# Patient Record
Sex: FEMALE | Race: WHITE | Employment: OTHER | URBAN - NONMETROPOLITAN AREA
[De-identification: names, ages, dates, MRNs, and addresses within clinical notes are randomized per-mention and may not be internally consistent; named-entity substitution may affect disease eponyms.]

---

## 2024-02-26 ENCOUNTER — HOSPITAL ENCOUNTER (INPATIENT)
Age: 74
LOS: 2 days | Discharge: HOME | DRG: 310 | End: 2024-02-28
Attending: EMERGENCY MEDICINE | Admitting: INTERNAL MEDICINE
Payer: MEDICARE

## 2024-02-26 ENCOUNTER — APPOINTMENT (OUTPATIENT)
Dept: GENERAL RADIOLOGY | Age: 74
DRG: 310 | End: 2024-02-26
Payer: MEDICARE

## 2024-02-26 DIAGNOSIS — I48.91 ATRIAL FIBRILLATION WITH RVR (HCC): ICD-10-CM

## 2024-02-26 DIAGNOSIS — I48.91 ATRIAL FIBRILLATION WITH RAPID VENTRICULAR RESPONSE (HCC): Primary | ICD-10-CM

## 2024-02-26 LAB
ALBUMIN SERPL-MCNC: 4.6 G/DL (ref 3.5–5.2)
ALP SERPL-CCNC: 112 U/L (ref 35–104)
ALT SERPL-CCNC: 40 U/L (ref 5–33)
ANION GAP SERPL CALCULATED.3IONS-SCNC: 14 MMOL/L (ref 9–17)
AST SERPL-CCNC: 27 U/L
BASOPHILS # BLD: 0.03 K/UL (ref 0–0.2)
BASOPHILS NFR BLD: 0 % (ref 0–2)
BILIRUB SERPL-MCNC: 0.7 MG/DL (ref 0.3–1.2)
BUN SERPL-MCNC: 17 MG/DL (ref 8–23)
BUN/CREAT SERPL: 17 (ref 9–20)
CALCIUM SERPL-MCNC: 9.9 MG/DL (ref 8.6–10.4)
CHLORIDE SERPL-SCNC: 104 MMOL/L (ref 98–107)
CO2 SERPL-SCNC: 23 MMOL/L (ref 20–31)
CREAT SERPL-MCNC: 1 MG/DL (ref 0.5–0.9)
D DIMER PPP FEU-MCNC: 0.54 UG/ML FEU (ref 0–0.59)
EKG Q-T INTERVAL: 250 MS
EKG QRS DURATION: 72 MS
EKG QTC CALCULATION (BAZETT): 409 MS
EKG R AXIS: 130 DEGREES
EKG T AXIS: 3 DEGREES
EKG VENTRICULAR RATE: 161 BPM
EOSINOPHIL # BLD: 0.24 K/UL (ref 0–0.4)
EOSINOPHILS RELATIVE PERCENT: 3 % (ref 0–5)
ERYTHROCYTE [DISTWIDTH] IN BLOOD BY AUTOMATED COUNT: 13.1 % (ref 12.1–15.2)
GFR SERPL CREATININE-BSD FRML MDRD: 60 ML/MIN/1.73M2
GLUCOSE SERPL-MCNC: 119 MG/DL (ref 70–99)
HCT VFR BLD AUTO: 44 % (ref 36–46)
HGB BLD-MCNC: 14.2 G/DL (ref 12–16)
IMM GRANULOCYTES # BLD AUTO: 0 K/UL (ref 0–0.3)
IMM GRANULOCYTES NFR BLD: 0 % (ref 0–5)
LYMPHOCYTES NFR BLD: 1.41 K/UL (ref 1–4.8)
LYMPHOCYTES RELATIVE PERCENT: 20 % (ref 15–40)
MAGNESIUM SERPL-MCNC: 2.2 MG/DL (ref 1.6–2.6)
MCH RBC QN AUTO: 28.2 PG (ref 26–34)
MCHC RBC AUTO-ENTMCNC: 32.3 G/DL (ref 31–37)
MCV RBC AUTO: 87.5 FL (ref 80–100)
MONOCYTES NFR BLD: 0.31 K/UL (ref 0–1)
MONOCYTES NFR BLD: 4 % (ref 4–8)
NEUTROPHILS NFR BLD: 73 % (ref 47–75)
NEUTS SEG NFR BLD: 5.08 K/UL (ref 2.5–7)
PLATELET # BLD AUTO: 211 K/UL (ref 140–450)
PMV BLD AUTO: 11.1 FL (ref 6–12)
POTASSIUM SERPL-SCNC: 4 MMOL/L (ref 3.7–5.3)
PROT SERPL-MCNC: 7.3 G/DL (ref 6.4–8.3)
RBC # BLD AUTO: 5.03 M/UL (ref 4–5.2)
SODIUM SERPL-SCNC: 141 MMOL/L (ref 135–144)
TROPONIN I SERPL HS-MCNC: 15 NG/L (ref 0–14)
TROPONIN I SERPL HS-MCNC: 15 NG/L (ref 0–14)
TSH SERPL DL<=0.05 MIU/L-ACNC: 3.92 UIU/ML (ref 0.3–5)
WBC OTHER # BLD: 7.1 K/UL (ref 3.5–11)

## 2024-02-26 PROCEDURE — 2000000000 HC ICU R&B

## 2024-02-26 PROCEDURE — 6360000002 HC RX W HCPCS: Performed by: EMERGENCY MEDICINE

## 2024-02-26 PROCEDURE — 96365 THER/PROPH/DIAG IV INF INIT: CPT

## 2024-02-26 PROCEDURE — 6370000000 HC RX 637 (ALT 250 FOR IP): Performed by: EMERGENCY MEDICINE

## 2024-02-26 PROCEDURE — 84436 ASSAY OF TOTAL THYROXINE: CPT

## 2024-02-26 PROCEDURE — 36415 COLL VENOUS BLD VENIPUNCTURE: CPT

## 2024-02-26 PROCEDURE — 84443 ASSAY THYROID STIM HORMONE: CPT

## 2024-02-26 PROCEDURE — 99285 EMERGENCY DEPT VISIT HI MDM: CPT

## 2024-02-26 PROCEDURE — 84484 ASSAY OF TROPONIN QUANT: CPT

## 2024-02-26 PROCEDURE — 96375 TX/PRO/DX INJ NEW DRUG ADDON: CPT

## 2024-02-26 PROCEDURE — 83735 ASSAY OF MAGNESIUM: CPT

## 2024-02-26 PROCEDURE — 2500000003 HC RX 250 WO HCPCS: Performed by: INTERNAL MEDICINE

## 2024-02-26 PROCEDURE — 2500000003 HC RX 250 WO HCPCS: Performed by: EMERGENCY MEDICINE

## 2024-02-26 PROCEDURE — 96366 THER/PROPH/DIAG IV INF ADDON: CPT

## 2024-02-26 PROCEDURE — 6370000000 HC RX 637 (ALT 250 FOR IP): Performed by: INTERNAL MEDICINE

## 2024-02-26 PROCEDURE — 2580000003 HC RX 258: Performed by: INTERNAL MEDICINE

## 2024-02-26 PROCEDURE — 85025 COMPLETE CBC W/AUTO DIFF WBC: CPT

## 2024-02-26 PROCEDURE — 71045 X-RAY EXAM CHEST 1 VIEW: CPT

## 2024-02-26 PROCEDURE — 85379 FIBRIN DEGRADATION QUANT: CPT

## 2024-02-26 PROCEDURE — 93010 ELECTROCARDIOGRAM REPORT: CPT | Performed by: INTERNAL MEDICINE

## 2024-02-26 PROCEDURE — 94761 N-INVAS EAR/PLS OXIMETRY MLT: CPT

## 2024-02-26 PROCEDURE — 80053 COMPREHEN METABOLIC PANEL: CPT

## 2024-02-26 PROCEDURE — 93005 ELECTROCARDIOGRAM TRACING: CPT | Performed by: EMERGENCY MEDICINE

## 2024-02-26 RX ORDER — BUSPIRONE HYDROCHLORIDE 15 MG/1
15 TABLET ORAL 2 TIMES DAILY
Status: DISCONTINUED | OUTPATIENT
Start: 2024-02-26 | End: 2024-02-28 | Stop reason: HOSPADM

## 2024-02-26 RX ORDER — ATORVASTATIN CALCIUM 20 MG/1
20 TABLET, FILM COATED ORAL EVERY 24 HOURS
COMMUNITY

## 2024-02-26 RX ORDER — BUSPIRONE HYDROCHLORIDE 15 MG/1
15 TABLET ORAL 2 TIMES DAILY
COMMUNITY
Start: 2023-08-08

## 2024-02-26 RX ORDER — ONDANSETRON 2 MG/ML
4 INJECTION INTRAMUSCULAR; INTRAVENOUS EVERY 6 HOURS PRN
Status: DISCONTINUED | OUTPATIENT
Start: 2024-02-26 | End: 2024-02-28 | Stop reason: HOSPADM

## 2024-02-26 RX ORDER — SODIUM CHLORIDE 9 MG/ML
INJECTION, SOLUTION INTRAVENOUS PRN
Status: DISCONTINUED | OUTPATIENT
Start: 2024-02-26 | End: 2024-02-28 | Stop reason: HOSPADM

## 2024-02-26 RX ORDER — POLYETHYLENE GLYCOL 3350 17 G/17G
17 POWDER, FOR SOLUTION ORAL DAILY PRN
Status: DISCONTINUED | OUTPATIENT
Start: 2024-02-26 | End: 2024-02-28 | Stop reason: HOSPADM

## 2024-02-26 RX ORDER — BUDESONIDE AND FORMOTEROL FUMARATE DIHYDRATE 160; 4.5 UG/1; UG/1
2 AEROSOL RESPIRATORY (INHALATION) 2 TIMES DAILY
COMMUNITY
Start: 2024-02-05

## 2024-02-26 RX ORDER — DILTIAZEM HCL IN NACL,ISO-OSM 125 MG/125
2.5-15 PLASTIC BAG, INJECTION (ML) INTRAVENOUS CONTINUOUS
Status: DISCONTINUED | OUTPATIENT
Start: 2024-02-26 | End: 2024-02-28 | Stop reason: HOSPADM

## 2024-02-26 RX ORDER — ATORVASTATIN CALCIUM 20 MG/1
20 TABLET, FILM COATED ORAL DAILY
Status: DISCONTINUED | OUTPATIENT
Start: 2024-02-27 | End: 2024-02-28 | Stop reason: HOSPADM

## 2024-02-26 RX ORDER — ACETAMINOPHEN 650 MG/1
650 SUPPOSITORY RECTAL EVERY 6 HOURS PRN
Status: DISCONTINUED | OUTPATIENT
Start: 2024-02-26 | End: 2024-02-28 | Stop reason: HOSPADM

## 2024-02-26 RX ORDER — SODIUM CHLORIDE 0.9 % (FLUSH) 0.9 %
5-40 SYRINGE (ML) INJECTION PRN
Status: DISCONTINUED | OUTPATIENT
Start: 2024-02-26 | End: 2024-02-28 | Stop reason: HOSPADM

## 2024-02-26 RX ORDER — DILTIAZEM HYDROCHLORIDE 120 MG/1
120 CAPSULE, COATED, EXTENDED RELEASE ORAL DAILY
Status: DISCONTINUED | OUTPATIENT
Start: 2024-02-26 | End: 2024-02-28 | Stop reason: HOSPADM

## 2024-02-26 RX ORDER — LISINOPRIL 20 MG/1
20 TABLET ORAL DAILY
Status: DISCONTINUED | OUTPATIENT
Start: 2024-02-26 | End: 2024-02-26 | Stop reason: CLARIF

## 2024-02-26 RX ORDER — METOPROLOL TARTRATE 1 MG/ML
5 INJECTION, SOLUTION INTRAVENOUS ONCE
Status: COMPLETED | OUTPATIENT
Start: 2024-02-26 | End: 2024-02-26

## 2024-02-26 RX ORDER — SODIUM CHLORIDE 9 MG/ML
INJECTION, SOLUTION INTRAVENOUS CONTINUOUS
Status: DISCONTINUED | OUTPATIENT
Start: 2024-02-26 | End: 2024-02-27

## 2024-02-26 RX ORDER — DILTIAZEM HYDROCHLORIDE 5 MG/ML
10 INJECTION INTRAVENOUS ONCE
Status: COMPLETED | OUTPATIENT
Start: 2024-02-26 | End: 2024-02-26

## 2024-02-26 RX ORDER — LISINOPRIL 20 MG/1
20 TABLET ORAL DAILY
COMMUNITY
Start: 2023-11-30

## 2024-02-26 RX ORDER — ONDANSETRON 4 MG/1
4 TABLET, ORALLY DISINTEGRATING ORAL EVERY 8 HOURS PRN
Status: DISCONTINUED | OUTPATIENT
Start: 2024-02-26 | End: 2024-02-28 | Stop reason: HOSPADM

## 2024-02-26 RX ORDER — AMLODIPINE BESYLATE 10 MG/1
10 TABLET ORAL EVERY EVENING
Status: ON HOLD | COMMUNITY
End: 2024-02-27 | Stop reason: HOSPADM

## 2024-02-26 RX ORDER — LEVOTHYROXINE SODIUM 112 UG/1
112 TABLET ORAL DAILY
Status: DISCONTINUED | OUTPATIENT
Start: 2024-02-27 | End: 2024-02-28 | Stop reason: HOSPADM

## 2024-02-26 RX ORDER — LISINOPRIL AND HYDROCHLOROTHIAZIDE 12.5; 1 MG/1; MG/1
1 TABLET ORAL DAILY
COMMUNITY
Start: 2023-11-25

## 2024-02-26 RX ORDER — HYDROCHLOROTHIAZIDE 25 MG/1
12.5 TABLET ORAL DAILY
Status: DISCONTINUED | OUTPATIENT
Start: 2024-02-27 | End: 2024-02-28 | Stop reason: HOSPADM

## 2024-02-26 RX ORDER — SODIUM CHLORIDE 0.9 % (FLUSH) 0.9 %
5-40 SYRINGE (ML) INJECTION EVERY 12 HOURS SCHEDULED
Status: DISCONTINUED | OUTPATIENT
Start: 2024-02-26 | End: 2024-02-28 | Stop reason: HOSPADM

## 2024-02-26 RX ORDER — ACETAMINOPHEN 325 MG/1
650 TABLET ORAL EVERY 6 HOURS PRN
Status: DISCONTINUED | OUTPATIENT
Start: 2024-02-26 | End: 2024-02-28 | Stop reason: HOSPADM

## 2024-02-26 RX ORDER — LISINOPRIL AND HYDROCHLOROTHIAZIDE 12.5; 1 MG/1; MG/1
1 TABLET ORAL DAILY
Status: DISCONTINUED | OUTPATIENT
Start: 2024-02-26 | End: 2024-02-26 | Stop reason: CLARIF

## 2024-02-26 RX ORDER — LEVOTHYROXINE SODIUM 112 UG/1
112 TABLET ORAL DAILY
COMMUNITY
Start: 2023-09-05

## 2024-02-26 RX ORDER — DIGOXIN 0.25 MG/ML
500 INJECTION INTRAMUSCULAR; INTRAVENOUS ONCE
Status: COMPLETED | OUTPATIENT
Start: 2024-02-26 | End: 2024-02-26

## 2024-02-26 RX ADMIN — DIGOXIN 500 MCG: 0.25 INJECTION INTRAMUSCULAR; INTRAVENOUS at 13:21

## 2024-02-26 RX ADMIN — Medication 5 MG/HR: at 12:55

## 2024-02-26 RX ADMIN — DILTIAZEM HYDROCHLORIDE 10 MG: 5 INJECTION, SOLUTION INTRAVENOUS at 12:41

## 2024-02-26 RX ADMIN — BUSPIRONE HYDROCHLORIDE 15 MG: 15 TABLET ORAL at 20:12

## 2024-02-26 RX ADMIN — DILTIAZEM HYDROCHLORIDE 10 MG: 5 INJECTION, SOLUTION INTRAVENOUS at 13:47

## 2024-02-26 RX ADMIN — DILTIAZEM HYDROCHLORIDE 120 MG: 120 CAPSULE, COATED, EXTENDED RELEASE ORAL at 17:12

## 2024-02-26 RX ADMIN — METOROPROLOL TARTRATE 5 MG: 5 INJECTION, SOLUTION INTRAVENOUS at 17:08

## 2024-02-26 RX ADMIN — SODIUM CHLORIDE: 9 INJECTION, SOLUTION INTRAVENOUS at 16:32

## 2024-02-26 RX ADMIN — APIXABAN 5 MG: 5 TABLET, FILM COATED ORAL at 20:12

## 2024-02-26 RX ADMIN — APIXABAN 5 MG: 5 TABLET, FILM COATED ORAL at 13:50

## 2024-02-26 ASSESSMENT — LIFESTYLE VARIABLES
HOW MANY STANDARD DRINKS CONTAINING ALCOHOL DO YOU HAVE ON A TYPICAL DAY: PATIENT DOES NOT DRINK
HOW OFTEN DO YOU HAVE A DRINK CONTAINING ALCOHOL: NEVER

## 2024-02-26 ASSESSMENT — PAIN - FUNCTIONAL ASSESSMENT: PAIN_FUNCTIONAL_ASSESSMENT: NONE - DENIES PAIN

## 2024-02-26 NOTE — ED PROVIDER NOTES
EMERGENCY DEPARTMENT ENCOUNTER      CHIEF COMPLAINT    Chief Complaint   Patient presents with    Atrial Fibrillation     SOB x 1 week. Was sent over by PCP for new onset a fib RVR.        HPI    Clover Barry is a 74 y.o. female with history of hypertension who presents to the emergency room for evaluation of atrial fibrillation.  She was seeing her family doctor today and EKG was performed showing atrial fibrillation with rapid ventricular response. She reports shortness of breath with exertion over the last 1 week with associated cough.  No chest pain or other symptoms to report.  She denies history of atrial fibrillation.  Her [id number] score is 3.      PAST MEDICAL HISTORY    History reviewed. No pertinent past medical history.    SURGICAL HISTORY    Past Surgical History:   Procedure Laterality Date    HYSTERECTOMY (CERVIX STATUS UNKNOWN)         CURRENT MEDICATIONS        ALLERGIES    Allergies   Allergen Reactions    Codeine Nausea And Vomiting       FAMILY HISTORY    History reviewed. No pertinent family history.    SOCIAL HISTORY    Social History     Tobacco Use    Smoking status: Never    Smokeless tobacco: Never   Substance and Sexual Activity    Alcohol use: Never    Drug use: Never     Review of Systems:    All relevant systems are reviewed and are negative with the exception of that stated in the HPI.    PHYSICAL EXAM    VITAL SIGNS: BP (!) 117/94   Pulse (!) 153   Temp 97.9 °F (36.6 °C) (Temporal)   Resp 22   Ht 1.702 m (5' 7\")   Wt 83.9 kg (185 lb)   SpO2 98%   BMI 28.98 kg/m²    Constitutional:  Well developed, Well nourished, No acute distress, Non-toxic appearance.   Eyes:  PERRL, EOMI, Conjunctiva normal, No discharge.   Respiratory:  Normal breath sounds, No respiratory distress, No wheezing, No chest tenderness.   Cardiovascular: Tachycardic rate, irregularly irregular rhythm, no murmurs, No rubs, No gallops.   GI:  Bowel sounds normal, Soft, No tenderness, No masses, No

## 2024-02-26 NOTE — PROGRESS NOTES
Pt transported to room via stretcher. Pt A&O x 4. Oriented to room and call light. Ambulates independently with steady gait to BR then returned to bed. Admission questions completed by patient and patient's daughter Jaimie. Dr. Linn called and updated on patient status. New orders received and entered. Okay per Dr. Linn to do ECHO in morning. Katharine from radiology notified, will be up tomorrow around 0930 to complete ECHO.

## 2024-02-27 ENCOUNTER — APPOINTMENT (OUTPATIENT)
Dept: NUCLEAR MEDICINE | Age: 74
DRG: 310 | End: 2024-02-27
Payer: MEDICARE

## 2024-02-27 ENCOUNTER — APPOINTMENT (OUTPATIENT)
Age: 74
DRG: 310 | End: 2024-02-27
Payer: MEDICARE

## 2024-02-27 ENCOUNTER — HOSPITAL ENCOUNTER (INPATIENT)
Age: 74
Discharge: HOME | DRG: 310 | End: 2024-02-29
Payer: MEDICARE

## 2024-02-27 VITALS — DIASTOLIC BLOOD PRESSURE: 75 MMHG | HEART RATE: 99 BPM | SYSTOLIC BLOOD PRESSURE: 105 MMHG

## 2024-02-27 LAB
ANION GAP SERPL CALCULATED.3IONS-SCNC: 11 MMOL/L (ref 9–17)
BUN SERPL-MCNC: 18 MG/DL (ref 8–23)
BUN/CREAT SERPL: 20 (ref 9–20)
CALCIUM SERPL-MCNC: 9 MG/DL (ref 8.6–10.4)
CHLORIDE SERPL-SCNC: 109 MMOL/L (ref 98–107)
CO2 SERPL-SCNC: 22 MMOL/L (ref 20–31)
CREAT SERPL-MCNC: 0.9 MG/DL (ref 0.5–0.9)
EKG Q-T INTERVAL: 350 MS
EKG QRS DURATION: 82 MS
EKG QTC CALCULATION (BAZETT): 469 MS
EKG R AXIS: 167 DEGREES
EKG T AXIS: -143 DEGREES
EKG VENTRICULAR RATE: 108 BPM
GFR SERPL CREATININE-BSD FRML MDRD: >60 ML/MIN/1.73M2
GLUCOSE SERPL-MCNC: 126 MG/DL (ref 70–99)
POTASSIUM SERPL-SCNC: 4.1 MMOL/L (ref 3.7–5.3)
SODIUM SERPL-SCNC: 142 MMOL/L (ref 135–144)
T4 SERPL-MCNC: 8.3 UG/DL (ref 4.5–11.7)
TROPONIN I SERPL HS-MCNC: 14 NG/L (ref 0–14)

## 2024-02-27 PROCEDURE — 84484 ASSAY OF TROPONIN QUANT: CPT

## 2024-02-27 PROCEDURE — 1200000000 HC SEMI PRIVATE

## 2024-02-27 PROCEDURE — 36415 COLL VENOUS BLD VENIPUNCTURE: CPT

## 2024-02-27 PROCEDURE — 6370000000 HC RX 637 (ALT 250 FOR IP): Performed by: INTERNAL MEDICINE

## 2024-02-27 PROCEDURE — 3430000000 HC RX DIAGNOSTIC RADIOPHARMACEUTICAL: Performed by: INTERNAL MEDICINE

## 2024-02-27 PROCEDURE — 2580000003 HC RX 258: Performed by: INTERNAL MEDICINE

## 2024-02-27 PROCEDURE — 99223 1ST HOSP IP/OBS HIGH 75: CPT | Performed by: INTERNAL MEDICINE

## 2024-02-27 PROCEDURE — 93005 ELECTROCARDIOGRAM TRACING: CPT | Performed by: INTERNAL MEDICINE

## 2024-02-27 PROCEDURE — 2500000003 HC RX 250 WO HCPCS: Performed by: INTERNAL MEDICINE

## 2024-02-27 PROCEDURE — 93017 CV STRESS TEST TRACING ONLY: CPT

## 2024-02-27 PROCEDURE — 94761 N-INVAS EAR/PLS OXIMETRY MLT: CPT

## 2024-02-27 PROCEDURE — 6360000002 HC RX W HCPCS: Performed by: INTERNAL MEDICINE

## 2024-02-27 PROCEDURE — A9500 TC99M SESTAMIBI: HCPCS | Performed by: INTERNAL MEDICINE

## 2024-02-27 PROCEDURE — 80048 BASIC METABOLIC PNL TOTAL CA: CPT

## 2024-02-27 PROCEDURE — 93306 TTE W/DOPPLER COMPLETE: CPT

## 2024-02-27 PROCEDURE — 78452 HT MUSCLE IMAGE SPECT MULT: CPT

## 2024-02-27 RX ORDER — SODIUM CHLORIDE 9 MG/ML
500 INJECTION, SOLUTION INTRAVENOUS CONTINUOUS PRN
Status: ACTIVE | OUTPATIENT
Start: 2024-02-27 | End: 2024-02-27

## 2024-02-27 RX ORDER — TETRAKIS(2-METHOXYISOBUTYLISOCYANIDE)COPPER(I) TETRAFLUOROBORATE 1 MG/ML
30 INJECTION, POWDER, LYOPHILIZED, FOR SOLUTION INTRAVENOUS
Status: COMPLETED | OUTPATIENT
Start: 2024-02-27 | End: 2024-02-27

## 2024-02-27 RX ORDER — BENZONATATE 100 MG/1
100 CAPSULE ORAL 3 TIMES DAILY PRN
Status: DISCONTINUED | OUTPATIENT
Start: 2024-02-27 | End: 2024-02-28 | Stop reason: HOSPADM

## 2024-02-27 RX ORDER — DIGOXIN 0.25 MG/ML
125 INJECTION INTRAMUSCULAR; INTRAVENOUS ONCE
Status: COMPLETED | OUTPATIENT
Start: 2024-02-27 | End: 2024-02-27

## 2024-02-27 RX ORDER — ATROPINE SULFATE 0.1 MG/ML
0.5 INJECTION INTRAVENOUS EVERY 5 MIN PRN
Status: ACTIVE | OUTPATIENT
Start: 2024-02-27 | End: 2024-02-27

## 2024-02-27 RX ORDER — DIGOXIN 125 MCG
125 TABLET ORAL DAILY
Status: DISCONTINUED | OUTPATIENT
Start: 2024-02-28 | End: 2024-02-28

## 2024-02-27 RX ORDER — SODIUM CHLORIDE 0.9 % (FLUSH) 0.9 %
5-40 SYRINGE (ML) INJECTION PRN
Status: ACTIVE | OUTPATIENT
Start: 2024-02-27 | End: 2024-02-27

## 2024-02-27 RX ORDER — REGADENOSON 0.08 MG/ML
0.4 INJECTION, SOLUTION INTRAVENOUS
Status: COMPLETED | OUTPATIENT
Start: 2024-02-27 | End: 2024-02-27

## 2024-02-27 RX ORDER — DILTIAZEM HYDROCHLORIDE 120 MG/1
120 CAPSULE, COATED, EXTENDED RELEASE ORAL DAILY
Qty: 30 CAPSULE | Refills: 3 | Status: SHIPPED | OUTPATIENT
Start: 2024-02-27

## 2024-02-27 RX ORDER — TETRAKIS(2-METHOXYISOBUTYLISOCYANIDE)COPPER(I) TETRAFLUOROBORATE 1 MG/ML
10 INJECTION, POWDER, LYOPHILIZED, FOR SOLUTION INTRAVENOUS
Status: COMPLETED | OUTPATIENT
Start: 2024-02-27 | End: 2024-02-27

## 2024-02-27 RX ORDER — BENZONATATE 100 MG/1
100 CAPSULE ORAL 3 TIMES DAILY PRN
Qty: 30 CAPSULE | Refills: 0 | Status: SHIPPED | OUTPATIENT
Start: 2024-02-27 | End: 2024-03-08

## 2024-02-27 RX ORDER — ASPIRIN 81 MG/1
81 TABLET, CHEWABLE ORAL DAILY
Status: DISCONTINUED | OUTPATIENT
Start: 2024-02-27 | End: 2024-02-28 | Stop reason: HOSPADM

## 2024-02-27 RX ORDER — METOPROLOL TARTRATE 1 MG/ML
5 INJECTION, SOLUTION INTRAVENOUS EVERY 5 MIN PRN
Status: ACTIVE | OUTPATIENT
Start: 2024-02-27 | End: 2024-02-27

## 2024-02-27 RX ORDER — ASPIRIN 81 MG/1
81 TABLET, CHEWABLE ORAL DAILY
Qty: 30 TABLET | Refills: 3 | Status: SHIPPED | OUTPATIENT
Start: 2024-02-27

## 2024-02-27 RX ORDER — DILTIAZEM HYDROCHLORIDE 5 MG/ML
10 INJECTION INTRAVENOUS ONCE
Status: COMPLETED | OUTPATIENT
Start: 2024-02-27 | End: 2024-02-27

## 2024-02-27 RX ORDER — NITROGLYCERIN 0.4 MG/1
0.4 TABLET SUBLINGUAL EVERY 5 MIN PRN
Status: ACTIVE | OUTPATIENT
Start: 2024-02-27 | End: 2024-02-27

## 2024-02-27 RX ORDER — AMINOPHYLLINE 25 MG/ML
50 INJECTION, SOLUTION INTRAVENOUS PRN
Status: DISPENSED | OUTPATIENT
Start: 2024-02-27 | End: 2024-02-27

## 2024-02-27 RX ORDER — ALBUTEROL SULFATE 2.5 MG/3ML
2.5 SOLUTION RESPIRATORY (INHALATION) EVERY 6 HOURS PRN
Status: DISCONTINUED | OUTPATIENT
Start: 2024-02-27 | End: 2024-02-28 | Stop reason: HOSPADM

## 2024-02-27 RX ADMIN — TETRAKIS(2-METHOXYISOBUTYLISOCYANIDE)COPPER(I) TETRAFLUOROBORATE 30 MILLICURIE: 1 INJECTION, POWDER, LYOPHILIZED, FOR SOLUTION INTRAVENOUS at 10:47

## 2024-02-27 RX ADMIN — SODIUM CHLORIDE, PRESERVATIVE FREE 10 ML: 5 INJECTION INTRAVENOUS at 06:02

## 2024-02-27 RX ADMIN — BENZONATATE 100 MG: 100 CAPSULE ORAL at 19:44

## 2024-02-27 RX ADMIN — DILTIAZEM HYDROCHLORIDE 120 MG: 120 CAPSULE, COATED, EXTENDED RELEASE ORAL at 04:30

## 2024-02-27 RX ADMIN — LISINOPRIL 30 MG: 20 TABLET ORAL at 08:17

## 2024-02-27 RX ADMIN — HYDROCHLOROTHIAZIDE 12.5 MG: 25 TABLET ORAL at 08:17

## 2024-02-27 RX ADMIN — TETRAKIS(2-METHOXYISOBUTYLISOCYANIDE)COPPER(I) TETRAFLUOROBORATE 10 MILLICURIE: 1 INJECTION, POWDER, LYOPHILIZED, FOR SOLUTION INTRAVENOUS at 09:27

## 2024-02-27 RX ADMIN — METOPROLOL TARTRATE 12.5 MG: 25 TABLET, FILM COATED ORAL at 19:44

## 2024-02-27 RX ADMIN — BENZONATATE 100 MG: 100 CAPSULE ORAL at 08:28

## 2024-02-27 RX ADMIN — LEVOTHYROXINE SODIUM 112 MCG: 0.11 TABLET ORAL at 05:51

## 2024-02-27 RX ADMIN — APIXABAN 5 MG: 5 TABLET, FILM COATED ORAL at 19:44

## 2024-02-27 RX ADMIN — BUSPIRONE HYDROCHLORIDE 15 MG: 15 TABLET ORAL at 19:44

## 2024-02-27 RX ADMIN — APIXABAN 5 MG: 5 TABLET, FILM COATED ORAL at 08:17

## 2024-02-27 RX ADMIN — SODIUM CHLORIDE, PRESERVATIVE FREE 10 ML: 5 INJECTION INTRAVENOUS at 08:18

## 2024-02-27 RX ADMIN — DIGOXIN 125 MCG: 0.25 INJECTION INTRAMUSCULAR; INTRAVENOUS at 17:29

## 2024-02-27 RX ADMIN — BUSPIRONE HYDROCHLORIDE 15 MG: 15 TABLET ORAL at 08:18

## 2024-02-27 RX ADMIN — SODIUM CHLORIDE, PRESERVATIVE FREE 10 ML: 5 INJECTION INTRAVENOUS at 10:47

## 2024-02-27 RX ADMIN — REGADENOSON 0.4 MG: 0.08 INJECTION, SOLUTION INTRAVENOUS at 10:46

## 2024-02-27 RX ADMIN — SODIUM CHLORIDE: 9 INJECTION, SOLUTION INTRAVENOUS at 02:50

## 2024-02-27 RX ADMIN — SODIUM CHLORIDE, PRESERVATIVE FREE 10 ML: 5 INJECTION INTRAVENOUS at 19:59

## 2024-02-27 RX ADMIN — Medication 7.5 MG/HR: at 02:48

## 2024-02-27 RX ADMIN — ASPIRIN 81 MG 81 MG: 81 TABLET ORAL at 17:29

## 2024-02-27 RX ADMIN — METOPROLOL TARTRATE 12.5 MG: 25 TABLET, FILM COATED ORAL at 08:18

## 2024-02-27 RX ADMIN — ATORVASTATIN CALCIUM 20 MG: 20 TABLET, FILM COATED ORAL at 08:17

## 2024-02-27 RX ADMIN — DILTIAZEM HYDROCHLORIDE 10 MG: 5 INJECTION, SOLUTION INTRAVENOUS at 19:45

## 2024-02-27 ASSESSMENT — PAIN - FUNCTIONAL ASSESSMENT
PAIN_FUNCTIONAL_ASSESSMENT: NONE - DENIES PAIN

## 2024-02-27 NOTE — PROGRESS NOTES
Cardiology Full note dictated    Atrial fibrillation with RVR, unknown duration as she is asymptomatic  Bronchitis starting approximately 7-10 days ago with severe cough  SOB and REICH starting approximately 4 days ago, much worse yesterday, with her coming to ER and AF noted.  Strong family hx of CAD with 2 brothers having MI's  No previous hx of chest pain or sob.  Abnormal EKG with AF and NSST-T changes.    Plan:  Echo this morning  Lexiscan this morning (dose is available)  Control rate and if above tests ok, home this afternoon  30 day event recorder  Cardiovert in 3 weeks.    (My greeting:  \"I'm Dr. Linn, Head of the Cardiovascular [facility] [place], Chief of Cardiology and Head of cardiovascular services here in [place] and chief provider at the [facility].\")    (Her greeting:  \"Dr. Hough said I'm seeing you only because no one else is available.\")    (Thanks, Dr Hough)    Humberto Linn MD

## 2024-02-27 NOTE — PROGRESS NOTES
Pt becomes tachycardic with ambulation to and from bathroom. Telemetry reads  bpm. Pt asymptomatic. HR remains 90s-low 100s bpm after returning to bed. Continuous Diltiazem gtt titrated per orders.

## 2024-02-27 NOTE — CONSULTS
[facility]                [address] [id number]                              CONSULTATION      PATIENT NAME: LOBO BLANK             : 1950  MED REC NO: [id number]                          ROOM: 04  ACCOUNT NO: [id number]                       ADMIT DATE: 2024  PROVIDER: Humberto Linn MD      CONSULT DATE:  2024    CARDIOLOGY CONSULTATION:      REASON FOR CONSULT:  Atrial fibrillation with RVR.    HISTORY OF PRESENT ILLNESS:  The patient is a pleasant 74-year-old female, who has never had a cardiac issue in the past.  Denies any history of chest pain, unusual shortness of breath, PND, or orthopnea.  She has had no history of arrhythmias such as atrial fibrillation.  She has been limiting her activity.    She developed a bronchitis approximately 10 to 15 days ago.  She had severe cough, but otherwise was feeling relatively well.    Her symptoms improved.  However, approximately a week ago, she began feeling ill with shortness of breath.  She got a Z-Gerson and started to improve.  However, approximately a week ago, has been developing shortness of breath.  She had general fatigue with a low energy level.  She got to the point where she would have to stop when going up steps.    She denied any chest pain or chest discomfort, except for the pain with coughing.    She saw Hien Whitaker, who noted that she was in atrial fibrillation, was sent to the emergency room.    In the emergency room, she was in atrial fibrillation with an RVR up in the 170 to 180 range.  Her enzymes were detectable at 15, but did not increase, and this morning her troponin was 14.  Her EKG did show atrial fibrillation with RVR with nonspecific ST-T changes.    She is normally active.  She does; however, have a strong family history of coronary artery disease, but there are only 2 siblings, 2 brothers, who both have had myocardial infarctions.  Her father  of cancer at a

## 2024-02-27 NOTE — PROGRESS NOTES
Pt continues to become tachycardic with ambulation to and from bathroom. Telemetry reads -130s bpm. Pt asymptomatic. HR remains 90s-low 100s bpm after returning to bed. Continuous Diltiazem gtt titrated per orders.

## 2024-02-27 NOTE — PROGRESS NOTES
A/O x4. Medications administered as ordered. Pt remains in Afib on Telemetry. HR 80-90 bpms. Pt is asymptomatic. Pt denies pain, shortness of breath, palpitations, lightheadedness or dizziness. Pt reports a \"dry/nagging\" cough that she has had approximately one month. Pt ambulates to and from bathroom independently with this nurse at bedside. Pt updated on plan of care and physician rounds in the AM. Pt calm and cooperative resting in bed, with no complaints at this time.

## 2024-02-27 NOTE — PROGRESS NOTES
Cardiology    CST:  She has anterior defect with very little change in redistribution images.  Could be artifact, however, more concerned about anterior ischemia.    Her echo also showed apical hypokinesis with borderline EF 50%    I am concerned about CAD, but she is stable at this point.    Will continue Cardizem  mg daily and Lopressor 12.5 mg bid.  30 day event recorder.  Will see in office in 2 weeks and do CV is 3 weeks.  Will then plan on cardiac cath in 4-5 weeks.    Would add aspirin 81 mg daily.    OK to discharge today.    Thanks, Humberto Linn MD

## 2024-02-27 NOTE — PROGRESS NOTES
SW and RN case manager met with pt to complete assessment. Pt is alert and oriented and pleasant with conversation. Pt is a 74 year old  female admitted for atrial fib with RVR. Pt lives alone [place]. Pt uses no [facility] or community services. Pt drives and is able to get to appointments without concerns.     Pt is a full code and follows with Hien Whitaker CNP as PCP. Pt reports that she has advance directives but she is in the process of getting them revised since her spouse passed a few years ago. ACP note completed with pt and pt states that her dtr Jaimie would be her decision maker if needed. Pt reports that her medications have been affordable prior to her hospitalization. Pt is on Eliquis here and SW will check cost on prescription prior to her departure.     Pt plans to return home at discharge. Pt identifies no discharge needs or concerns. SW will remain available as needed. Deja GONZALEZ [facility] 2/27/2024     ALISHA visited with pt and dtr this afternoon and provided eliquis 30 day free trial card to pt and also information on how to apply for patient assistance with cost of eliquis if needed. Deja GONZALEZ TAMIKA 2/27/2024

## 2024-02-27 NOTE — H&P
Anticoagulation (Eliquis)      Documentation of the Current Medications in the Medical Record    (x)  I have utilized all available immediate resources to obtain, update, or review the patient's current medications (including all prescriptions, over-the-counter products, herbals, cannabis / cannabidiol products, vitamin / mineral / dietary (nutritional) supplements).  (Satisfies MIPS Performance)  If Yes, Stop Here  ( )  The patient is not eligible for medication reconciliation; the patient is in an emergent medical situation where delaying treatment would jeopardize the patient's health.  (MIPS Performance exception / exclusion)  ( )  I did not confirm, update or review the patient's current list of medications today.  (Does not satisfy MIPS Performance)        Advanced Care Plan    (x)  I confirmed that the patient's Advanced Care Plan is present, code status documented, or surrogate decision maker is listed in the patient's medical record.  ( )  The patient's advanced care plan is not present because:  (select)   ( ) I confirmed today that the patient does not wish or was not able to name a surrogate decision maker or provide an Advance Care Plan.   ( ) Hospice care is currently being provided or has been provided this calender year.  ( )  I did not confirm today the presence of an Advance Care Plan or surrogate decision maker documented within the patient's medical record. (Does not satisfy MIPS performance).            Ryan Hough MD, MD  Admitting Hospitalist

## 2024-02-27 NOTE — PLAN OF CARE
Problem: Discharge Planning  Goal: Discharge to home or other facility with appropriate resources  2/27/2024 0023 by Karrie Negrete RN  Outcome: Progressing  2/26/2024 1836 by Radha Shaw RN  Outcome: Progressing  Flowsheets  Taken 2/26/2024 1835  Discharge to home or other facility with appropriate resources: Identify barriers to discharge with patient and caregiver  Taken 2/26/2024 1731  Discharge to home or other facility with appropriate resources:   Identify barriers to discharge with patient and caregiver   Arrange for needed discharge resources and transportation as appropriate     Problem: ABCDS Injury Assessment  Goal: Absence of physical injury  2/27/2024 0023 by Karrie Negrete RN  Outcome: Progressing  2/26/2024 1836 by Radha Shaw RN  Outcome: Progressing  Flowsheets (Taken 2/26/2024 1835)  Absence of Physical Injury: Implement safety measures based on patient assessment     Problem: Neurosensory - Adult  Goal: Achieves stable or improved neurological status  Outcome: Progressing     Problem: Respiratory - Adult  Goal: Achieves optimal ventilation and oxygenation  2/27/2024 0023 by Karrie Negrete RN  Outcome: Progressing  2/26/2024 1836 by Radha Shaw RN  Outcome: Progressing  Flowsheets (Taken 2/26/2024 1835)  Achieves optimal ventilation and oxygenation:   Assess for changes in respiratory status   Position to facilitate oxygenation and minimize respiratory effort     Problem: Cardiovascular - Adult  Goal: Maintains optimal cardiac output and hemodynamic stability  Outcome: Progressing     Problem: Safety - Adult  Goal: Free from fall injury  Outcome: Progressing

## 2024-02-27 NOTE — PROGRESS NOTES
Per - give scheduled PO Cardizem now with HR of 100 and /71. Then turn Cardizem gtt off at 0600 to see how patient tolerates. Pts HR does increase to 130-140s bpm when talking and laughing.  and  aware.

## 2024-02-27 NOTE — CARE COORDINATION
Case Management Assessment  Initial Evaluation    Date/Time of Evaluation: 2/27/2024 10:17 AM  Assessment Completed by: Lázaro Hernandez RN    If patient is discharged prior to next notation, then this note serves as note for discharge by case management.    Patient Name: Clover Barry                   YOB: 1950  Diagnosis: Atrial fibrillation with rapid ventricular response (HCC) [I48.91]  Atrial fibrillation with RVR (HCC) [I48.91]                   Date / Time: 2/26/2024 12:18 PM    Patient Admission Status: Inpatient   Readmission Risk (Low < 19, Mod (19-27), High > 27): Readmission Risk Score: 5.6    Current PCP: Hien Whitaker APRN - CNP  PCP verified by CM? (P) Yes (Hien Whitaker CNP)    Chart Reviewed: Yes      History Provided by: (P) Patient  Patient Orientation: (P) Alert and Oriented, Person, Place, Situation, Self    Patient Cognition: (P) Alert    Hospitalization in the last 30 days (Readmission):  No    If yes, Readmission Assessment in  Navigator will be completed.    Advance Directives:      Code Status: Full Code   Patient's Primary Decision Maker is: (P) Patient Declined (Legal Next of Kin Remains as Decision Maker)    Primary Decision Maker: Dani Combsn - Child - [phone]    Discharge Planning:    Patient lives with: (P) Alone Type of Home: (P) House  Primary Care Giver: (P) Self  Patient Support Systems include: (P) Children, Family Members   Current Financial resources: (P) Medicare  Current community resources: (P) None  Current services prior to admission: (P) None            Current DME:              Type of Home Care services:  (P) None    ADLS  Prior functional level: (P) Independent in ADLs/IADLs  Current functional level: (P) Independent in ADLs/IADLs    PT AM-PAC:   /24  OT AM-PAC:   /24    Family can provide assistance at DC: (P) Yes  Would you like Case Management to discuss the discharge plan with any other family members/significant others, and if so, who?

## 2024-02-27 NOTE — ACP (ADVANCE CARE PLANNING)
Advance Care Planning     Advance Care Planning Activator (Inpatient)  Conversation Note      Date of ACP Conversation: 2/27/2024     Conversation Conducted with: Patient with Decision Making Capacity    ACP Activator: YOMI Donald        Health Care Decision Maker:     Current Designated Health Care Decision Maker:     Primary Decision Maker: Jaimie Combs - [phone]  Click here to complete Healthcare Decision Makers including section of the Healthcare Decision Maker Relationship (ie \"Primary\")  Today we documented Decision Maker(s) consistent with Legal Next of Kin hierarchy.    Care Preferences    Ventilation:  \"If you were in your present state of health and suddenly became very ill and were unable to breathe on your own, what would your preference be about the use of a ventilator (breathing machine) if it were available to you?\"      Would the patient desire the use of ventilator (breathing machine)?: yes if it is a temporary measure    \"If your health worsens and it becomes clear that your chance of recovery is unlikely, what would your preference be about the use of a ventilator (breathing machine) if it were available to you?\"     Would the patient desire the use of ventilator (breathing machine)?: No      Resuscitation  \"CPR works best to restart the heart when there is a sudden event, like a heart attack, in someone who is otherwise healthy. Unfortunately, CPR does not typically restart the heart for people who have serious health conditions or who are very sick.\"    \"In the event your heart stopped as a result of an underlying serious health condition, would you want attempts to be made to restart your heart (answer \"yes\" for attempt to resuscitate) or would you prefer a natural death (answer \"no\" for do not attempt to resuscitate)?\" yes       [] Yes   [x] No   Educated Patient / Decision Maker regarding differences between Advance Directives and portable DNR orders.    Length of

## 2024-02-27 NOTE — DISCHARGE INSTRUCTIONS
Discharge Instructions    Admission Date:  2024  Discharge Date:  24    Disposition:   Home.    Discharge Instructions:  Resume previous home medication but discontinue Amlodipine.  Take Cardizem  mg daily.  Take Lopressor 25 m tablet two times a day.  Take Digoxin 125 mcg daily.    Take Eliquis 5 mg two times a day.  Take an 81 mg aspirin daily.  Take Tessalon 100 mg every 8 hours as needed for cough.    Activity:  No strenuous exercise until cleared by Dr. Linn.  Diet:  Cardiac.    30 day event monitor to be placed at discharge.      Follow up with Hien Whitaker APRN   @ 2:20pm   Follow up with Dr. Linn in 2 weeks.

## 2024-02-27 NOTE — PROGRESS NOTES
Patient heart rate 150-160 while patient is eating dinner. Dr Linn made aware. New orders placed. Holding discharged until morning

## 2024-02-28 ENCOUNTER — HOSPITAL ENCOUNTER (OUTPATIENT)
Age: 74
Discharge: HOME | DRG: 310 | End: 2024-03-01
Attending: INTERNAL MEDICINE
Payer: MEDICARE

## 2024-02-28 VITALS
WEIGHT: 182.8 LBS | RESPIRATION RATE: 29 BRPM | DIASTOLIC BLOOD PRESSURE: 94 MMHG | SYSTOLIC BLOOD PRESSURE: 115 MMHG | OXYGEN SATURATION: 94 % | BODY MASS INDEX: 28.69 KG/M2 | TEMPERATURE: 97.7 F | HEART RATE: 123 BPM | HEIGHT: 67 IN

## 2024-02-28 DIAGNOSIS — I48.91 ATRIAL FIBRILLATION WITH RAPID VENTRICULAR RESPONSE (HCC): ICD-10-CM

## 2024-02-28 PROCEDURE — 94761 N-INVAS EAR/PLS OXIMETRY MLT: CPT

## 2024-02-28 PROCEDURE — 2500000003 HC RX 250 WO HCPCS: Performed by: INTERNAL MEDICINE

## 2024-02-28 PROCEDURE — 93270 REMOTE 30 DAY ECG REV/REPORT: CPT

## 2024-02-28 PROCEDURE — 2580000003 HC RX 258: Performed by: INTERNAL MEDICINE

## 2024-02-28 PROCEDURE — 6370000000 HC RX 637 (ALT 250 FOR IP): Performed by: INTERNAL MEDICINE

## 2024-02-28 RX ORDER — DILTIAZEM HYDROCHLORIDE 5 MG/ML
10 INJECTION INTRAVENOUS ONCE
Status: COMPLETED | OUTPATIENT
Start: 2024-02-28 | End: 2024-02-28

## 2024-02-28 RX ORDER — DIGOXIN 125 MCG
125 TABLET ORAL DAILY
Qty: 30 TABLET | Refills: 3 | OUTPATIENT
Start: 2024-02-29

## 2024-02-28 RX ORDER — DIGOXIN 125 MCG
125 TABLET ORAL DAILY
Status: DISCONTINUED | OUTPATIENT
Start: 2024-02-28 | End: 2024-02-28 | Stop reason: HOSPADM

## 2024-02-28 RX ORDER — DIGOXIN 125 MCG
125 TABLET ORAL DAILY
Qty: 30 TABLET | Refills: 3 | Status: SHIPPED | OUTPATIENT
Start: 2024-02-29

## 2024-02-28 RX ADMIN — ATORVASTATIN CALCIUM 20 MG: 20 TABLET, FILM COATED ORAL at 09:31

## 2024-02-28 RX ADMIN — APIXABAN 5 MG: 5 TABLET, FILM COATED ORAL at 09:30

## 2024-02-28 RX ADMIN — DILTIAZEM HYDROCHLORIDE 10 MG: 5 INJECTION, SOLUTION INTRAVENOUS at 04:55

## 2024-02-28 RX ADMIN — LISINOPRIL 30 MG: 20 TABLET ORAL at 09:30

## 2024-02-28 RX ADMIN — BUSPIRONE HYDROCHLORIDE 15 MG: 15 TABLET ORAL at 09:31

## 2024-02-28 RX ADMIN — DILTIAZEM HYDROCHLORIDE 120 MG: 120 CAPSULE, COATED, EXTENDED RELEASE ORAL at 04:55

## 2024-02-28 RX ADMIN — METOPROLOL TARTRATE 12.5 MG: 25 TABLET, FILM COATED ORAL at 04:55

## 2024-02-28 RX ADMIN — ASPIRIN 81 MG 81 MG: 81 TABLET ORAL at 09:30

## 2024-02-28 RX ADMIN — DIGOXIN 125 MCG: 125 TABLET ORAL at 04:55

## 2024-02-28 RX ADMIN — METOPROLOL TARTRATE 25 MG: 25 TABLET, FILM COATED ORAL at 09:30

## 2024-02-28 RX ADMIN — SODIUM CHLORIDE, PRESERVATIVE FREE 10 ML: 5 INJECTION INTRAVENOUS at 09:31

## 2024-02-28 RX ADMIN — HYDROCHLOROTHIAZIDE 12.5 MG: 25 TABLET ORAL at 09:30

## 2024-02-28 RX ADMIN — LEVOTHYROXINE SODIUM 112 MCG: 0.11 TABLET ORAL at 04:55

## 2024-02-28 ASSESSMENT — PAIN - FUNCTIONAL ASSESSMENT: PAIN_FUNCTIONAL_ASSESSMENT: NONE - DENIES PAIN

## 2024-02-28 NOTE — PROGRESS NOTES
Acknowledge pt discharge to home this date. Eliquis assistance provided to pt yesterday if needed. No other anticipated discharge needs. Deja Valera MSW LSW 2/28/2024

## 2024-02-28 NOTE — PROGRESS NOTES
Spiritual Services Interventions  0263/0263-01   2/28/2024  Steve Tong    Clover Barry  74 y.o. year old female    Encounter Summary  Encounter Overview/Reason : (P) Initial Encounter  Service Provided For:: (P) Patient  Referral/Consult From:: (P) [place]  Last Encounter : (P) 02/28/24  Complexity of Encounter: (P) Moderate  Begin Time: (P) 0930  End Time : (P) 0945  Total Time Calculated: (P) 15 min     Spiritual/Emotional needs  Type: (P) Spiritual Support                    Assessment/Intervention/Outcome  Assessment: (P) Calm  Intervention: (P) Discussed belief system/Yarsanism practices/dima, Discussed illness injury and it’s impact, Prayer (assurance of)/[place]  Outcome: (P) Engaged in conversation, Encouraged

## 2024-02-28 NOTE — PROGRESS NOTES
Pt remains in Afib on Telemetry. HR 80-low 100s bpm. Pt is asymptomatic and resting in bed comfortably. Medications administered as ordered. PRN Tessalon administered per pt request for dry persistent cough. Pt updated on plan of care and physician rounding in the AM. Lights dimmed. No further needs from pt at this time.

## 2024-02-28 NOTE — PROGRESS NOTES
The patient was educated on the use of an event monitor. The patient's comprehension was high. The patient was able to verbalize recall. The patient was instructed on how and when to return the monitor.The patient was educated on the use of an event monitor.

## 2024-02-28 NOTE — PROGRESS NOTES
Pts -150s bpm on Telemetry in Afib. Pt resting in bed with eyes closed, asymptomatic.  made aware. New order for Cardizem IV 10mg-once and to give AM Digoxin, Cardizem, and Lopressor PO medications-now.

## 2024-02-28 NOTE — PLAN OF CARE
Problem: Discharge Planning  Goal: Discharge to home or other facility with appropriate resources  Outcome: Progressing     Problem: ABCDS Injury Assessment  Goal: Absence of physical injury  Outcome: Progressing     Problem: Neurosensory - Adult  Goal: Achieves stable or improved neurological status  Outcome: Progressing     Problem: Respiratory - Adult  Goal: Achieves optimal ventilation and oxygenation  Outcome: Progressing     Problem: Cardiovascular - Adult  Goal: Maintains optimal cardiac output and hemodynamic stability  Outcome: Progressing     Problem: Safety - Adult  Goal: Free from fall injury  Outcome: Progressing

## 2024-02-28 NOTE — PROGRESS NOTES
Hospitalist Progress Note  2/28/2024 6:16 AM  Subjective:   Admit Date: 2/26/2024  PCP: Hien Whitaker, APRN - CNP    Interval History: Herminia has no complaints this am. She denies chest pain and has no SOB with exertion.  Dr. Linn adjusting medication to control her heart rate.  Appetite is good, no nausea.  No trouble urinating.  Discussed avoiding caffeine at this could be a stimulate for her heart (heart rate increased in the afternoon after having coffee).  Discharge held yesterday afternoon after heart rate increased.   Dr. Linn's note reviewed from this morning.      Diet: ADULT DIET; Regular  Medications:   Scheduled Meds:   digoxin  125 mcg Oral Daily    metoprolol tartrate  25 mg Oral BID    aspirin  81 mg Oral Daily    atorvastatin  20 mg Oral Daily    [Held by provider] mometasone-formoterol  2 puff Inhalation BID RT    busPIRone  15 mg Oral BID    levothyroxine  112 mcg Oral Daily    sodium chloride flush  5-40 mL IntraVENous 2 times per day    apixaban  5 mg Oral BID    dilTIAZem  120 mg Oral Daily    lisinopril  30 mg Oral Daily    And    hydroCHLOROthiazide  12.5 mg Oral Daily     Continuous Infusions:   dilTIAZem Stopped (02/27/24 0602)    sodium chloride         Patient's current medications documented, reviewed, and updated.      CBC:   Recent Labs     02/26/24  1233   WBC 7.1   HGB 14.2        BMP:    Recent Labs     02/26/24  1233 02/27/24  0218    142   K 4.0 4.1    109*   CO2 23 22   BUN 17 18   CREATININE 1.0* 0.9   GLUCOSE 119* 126*     Hepatic:   Recent Labs     02/26/24  1233   AST 27   ALT 40*   BILITOT 0.7   ALKPHOS 112*     Troponin: No results for input(s): \"TROPONINI\" in the last 72 hours.  BNP: No results for input(s): \"BNP\" in the last 72 hours.  Lipids: No results for input(s): \"CHOL\", \"HDL\" in the last 72 hours.    Invalid input(s): \"LDLCALCU\"  INR: No results for input(s): \"INR\" in the last 72 hours.      Objective:   Vitals: /88   Pulse 97   Temp

## 2024-02-28 NOTE — DISCHARGE SUMMARY
at 119, Alk Phos at 112, and ALT at 40.  CBC was normal with a WBC at 7.1, Hgb 14.2, and  Plt ct at 211.  TSH was 3.92.  D dimer was 0.54.  Troponin was 15.       CXR showed:  1. Slight pulmonary vascular/venous prominence.   2. No overt pulmonary edema.   3. Heart size upper normal     ECG showed:  Atrial fibrillation with rapid ventricular response  Left posterior fascicular block  Nonspecific ST and T wave abnormality  Abnormal ECG     Clover was given IV Cardizem, Lopressor,  and Digoxin in the ER with the inability to control her rate.     Herminia was admitted to the ICU on a Cardizem drip.  She had been started in the ER on Eliquis 5 mg which was  continued in the hospital.  Dr. Linn from Cardiology was consulted.  Serial troponin levels showed no increase.  A Nuclear stress test showed an anterior defect with the concerns for anterior ischemia.  An ECHO showed an EF of 50% with apical hypokinesis.  Herminia continued to have a cough during admission.  Tessalon was prescribed.  After the stress test, 81 mg of aspirin was added daily.  Dr. Linn placed Herminia on Cardizem  mg daily with Lopressor 12.5 mg two times a day and she was weaned off the Cardizem drip.  After the testing, and ability to control her heart rate on medication, Dr. Linn felt she could be discharged on a 30 day event monitor with follow up with him in 2 weeks.  His plans are for cardioversion in 3 weeks with a cardiac cath in 4 to 5 weeks.  Before discharge, on 2/27, her heart rate increased back into the 140's.  Dr. Linn held her discharge and increased her Lopressor to 25 mg BID, gave a dose of IV Digoxin, and then added her on Digoxin 125 mcg daily.  Herminia did have coffee after her testing so it was felt this may have contributed.  I discussed avoiding all caffeine to prevent the increase in heart rate.  On 2/28, Dr. Linn felt Herminia could be discharged on Cardizem CD, Lopressor, Digoxin, and Eliquis on a the event monitor with  follow

## 2024-02-28 NOTE — PROGRESS NOTES
Pt remains in Afib on Telemetry. -160s bpm. Pt is asymptomatic.  made aware. New order for Cardizem IV 10mg-once.

## 2024-02-28 NOTE — PROGRESS NOTES
Discharge instructions given, demonstrates understanding.  Belongings given and signed for.  To be discharged with cardiac event monitor.

## 2024-02-28 NOTE — PROGRESS NOTES
Cardiology    Still struggling with RVR.  Added Digoxin 0.125 daily and increased Lopressor to 25 mg bid.    Even though she still has RVR, she is asymptomatic and ok to discharge with 30 day event recorder so I can monitor at home.    Humberto Linn MD

## 2024-02-28 NOTE — PROGRESS NOTES
Pt continues to become tachycardic with ambulation to and from bathroom. Telemetry reads -130s bpm. Pt asymptomatic. Approximately 5 minutes after returning to bed HR decreases to 90s-low 100s bpm.

## 2024-02-29 LAB
ECHO AO ASC DIAM: 2.2 CM
ECHO AO ASCENDING AORTA INDEX: 1.12 CM/M2
ECHO AO ROOT DIAM: 2.5 CM
ECHO AO ROOT INDEX: 1.28 CM/M2
ECHO AV AREA PEAK VELOCITY: 1.5 CM2
ECHO AV AREA/BSA PEAK VELOCITY: 0.8 CM2/M2
ECHO AV CUSP MM: 1.9 CM
ECHO AV PEAK GRADIENT: 4 MMHG
ECHO AV PEAK VELOCITY: 1.1 M/S
ECHO AV VELOCITY RATIO: 0.45
ECHO BSA: 1.99 M2
ECHO BSA: 1.99 M2
ECHO EST RA PRESSURE: 5 MMHG
ECHO LA DIAMETER INDEX: 1.84 CM/M2
ECHO LA DIAMETER: 3.6 CM
ECHO LA TO AORTIC ROOT RATIO: 1.44
ECHO LA VOL A-L A4C: 41 ML (ref 22–52)
ECHO LA VOL MOD A4C: 39 ML (ref 22–52)
ECHO LA VOLUME INDEX A-L A4C: 21 ML/M2 (ref 16–34)
ECHO LA VOLUME INDEX MOD A4C: 20 ML/M2 (ref 16–34)
ECHO LV E' LATERAL VELOCITY: 7 CM/S
ECHO LV EDV A4C: 72 ML
ECHO LV EDV INDEX A4C: 37 ML/M2
ECHO LV EJECTION FRACTION A4C: 40 %
ECHO LV ESV A4C: 43 ML
ECHO LV ESV INDEX A4C: 22 ML/M2
ECHO LV INTERNAL DIMENSION DIASTOLIC MMODE: 5.5 CM (ref 3.9–5.3)
ECHO LV INTERNAL DIMENSION SYSTOLIC MMODE: 4.6 CM
ECHO LV IVSD MMODE: 0.8 CM (ref 0.6–0.9)
ECHO LV IVSS MMODE: 0.8 CM
ECHO LV POSTERIOR WALL DIASTOLIC MMODE: 0.7 CM (ref 0.6–0.9)
ECHO LV POSTERIOR WALL SYSTOLIC MMODE: 1.1 CM
ECHO LVOT AREA: 3.1 CM2
ECHO LVOT DIAM: 2 CM
ECHO LVOT MEAN GRADIENT: 1 MMHG
ECHO LVOT PEAK GRADIENT: 1 MMHG
ECHO LVOT PEAK VELOCITY: 0.5 M/S
ECHO LVOT STROKE VOLUME INDEX: 14.6 ML/M2
ECHO LVOT SV: 28.6 ML
ECHO LVOT VTI: 9.1 CM
ECHO MV A VELOCITY: 0.01 M/S
ECHO MV AREA PHT: 4.6 CM2
ECHO MV E DECELERATION TIME (DT): 164 MS
ECHO MV E VELOCITY: 0.85 M/S
ECHO MV E/A RATIO: 85
ECHO MV E/E' LATERAL: 12.14
ECHO MV PRESSURE HALF TIME (PHT): 47.6 MS
ECHO PV MAX VELOCITY: 0.7 M/S
ECHO PV PEAK GRADIENT: 2 MMHG
ECHO RIGHT VENTRICULAR SYSTOLIC PRESSURE (RVSP): 28 MMHG
ECHO TV REGURGITANT MAX VELOCITY: 2.4 M/S
ECHO TV REGURGITANT PEAK GRADIENT: 23 MMHG
NUC STRESS EJECTION FRACTION: 37 %
STRESS TARGET HR: 148 BPM
TID: 0.95

## 2024-03-04 ENCOUNTER — TELEPHONE (OUTPATIENT)
Dept: CARDIOLOGY CLINIC | Age: 74
End: 2024-03-04

## 2024-03-05 ENCOUNTER — TELEPHONE (OUTPATIENT)
Dept: CARDIOLOGY CLINIC | Age: 74
End: 2024-03-05

## 2024-03-05 DIAGNOSIS — I48.91 ATRIAL FIBRILLATION WITH RVR (HCC): Primary | ICD-10-CM

## 2024-03-05 DIAGNOSIS — I48.91 ATRIAL FIBRILLATION WITH RAPID VENTRICULAR RESPONSE (HCC): Primary | ICD-10-CM

## 2024-03-05 NOTE — TELEPHONE ENCOUNTER
Herminia returned call. Advised of monitor strips from cardiac event monitor. Reports that she only knows her heart rate is up because her Apple watch shows her, she has no symptoms. She can be sitting and her heart rate will be in 120s in afib. She has appointment scheduled for March 18th at 9:30 AM, will plan on doing cardioversion at that time.

## 2024-03-19 ENCOUNTER — OFFICE VISIT (OUTPATIENT)
Dept: CARDIOLOGY CLINIC | Age: 74
End: 2024-03-19
Payer: MEDICARE

## 2024-03-19 ENCOUNTER — HOSPITAL ENCOUNTER (OUTPATIENT)
Age: 74
Discharge: HOME | End: 2024-03-21
Attending: INTERNAL MEDICINE
Payer: MEDICARE

## 2024-03-19 VITALS
HEART RATE: 90 BPM | BODY MASS INDEX: 28.98 KG/M2 | DIASTOLIC BLOOD PRESSURE: 80 MMHG | OXYGEN SATURATION: 92 % | WEIGHT: 185 LBS | SYSTOLIC BLOOD PRESSURE: 140 MMHG

## 2024-03-19 VITALS
OXYGEN SATURATION: 90 % | SYSTOLIC BLOOD PRESSURE: 105 MMHG | HEART RATE: 127 BPM | RESPIRATION RATE: 23 BRPM | DIASTOLIC BLOOD PRESSURE: 90 MMHG

## 2024-03-19 DIAGNOSIS — I48.91 ATRIAL FIBRILLATION WITH RVR (HCC): ICD-10-CM

## 2024-03-19 DIAGNOSIS — I48.91 ATRIAL FIBRILLATION WITH RAPID VENTRICULAR RESPONSE (HCC): Primary | ICD-10-CM

## 2024-03-19 DIAGNOSIS — E78.5 HYPERLIPIDEMIA, UNSPECIFIED HYPERLIPIDEMIA TYPE: ICD-10-CM

## 2024-03-19 DIAGNOSIS — Z01.818 PRE-OP TESTING: ICD-10-CM

## 2024-03-19 DIAGNOSIS — I15.9 SECONDARY HYPERTENSION: ICD-10-CM

## 2024-03-19 PROCEDURE — 92961 CARDIOVERSION ELECTRIC INT: CPT

## 2024-03-19 PROCEDURE — 99214 OFFICE O/P EST MOD 30 MIN: CPT | Performed by: INTERNAL MEDICINE

## 2024-03-19 PROCEDURE — 1124F ACP DISCUSS-NO DSCNMKR DOCD: CPT | Performed by: INTERNAL MEDICINE

## 2024-03-19 PROCEDURE — 2580000003 HC RX 258: Performed by: INTERNAL MEDICINE

## 2024-03-19 PROCEDURE — 93005 ELECTROCARDIOGRAM TRACING: CPT | Performed by: INTERNAL MEDICINE

## 2024-03-19 PROCEDURE — 6360000002 HC RX W HCPCS: Performed by: INTERNAL MEDICINE

## 2024-03-19 RX ORDER — AMPICILLIN TRIHYDRATE 250 MG
CAPSULE ORAL
COMMUNITY

## 2024-03-19 RX ORDER — SODIUM CHLORIDE 450 MG/100ML
INJECTION, SOLUTION INTRAVENOUS CONTINUOUS
Status: DISCONTINUED | OUTPATIENT
Start: 2024-03-19 | End: 2024-03-22 | Stop reason: HOSPADM

## 2024-03-19 RX ORDER — L. ACIDOPHILUS/L.BULGARICUS 1MM CELL
TABLET ORAL
COMMUNITY

## 2024-03-19 RX ORDER — DESVENLAFAXINE SUCCINATE 50 MG/1
50 TABLET, EXTENDED RELEASE ORAL DAILY
COMMUNITY

## 2024-03-19 RX ORDER — AMIODARONE HYDROCHLORIDE 200 MG/1
200 TABLET ORAL DAILY
Qty: 30 TABLET | Refills: 11 | Status: SHIPPED | OUTPATIENT
Start: 2024-03-19

## 2024-03-19 RX ADMIN — PROPOFOL 40 MG: 10 INJECTION, EMULSION INTRAVENOUS at 12:38

## 2024-03-19 RX ADMIN — SODIUM CHLORIDE: 4.5 INJECTION, SOLUTION INTRAVENOUS at 12:09

## 2024-03-19 RX ADMIN — PROPOFOL 160 MG: 10 INJECTION, EMULSION INTRAVENOUS at 12:33

## 2024-03-19 NOTE — PRE SEDATION
Sedation Plan  ASA: class 2 - patient with mild systemic disease     Mallampati class: II - soft palate, uvula, fauces visible.    Sedation plan: deep/analgesia    Risks, benefits, and alternatives discussed with patient.        Immediate reassessment prior to sedation:  Patient's status reviewed and vital signs assessed; acceptable to perform procedure and proceed to administer sedation as planned.

## 2024-03-19 NOTE — H&P
CARDIOLOGY CONSULTATION:       REASON FOR CONSULT:  Atrial fibrillation with RVR.     HISTORY OF PRESENT ILLNESS:  The patient is a pleasant 74-year-old female, who has never had a cardiac issue in the past.  Denies any history of chest pain, unusual shortness of breath, PND, or orthopnea.  She has had no history of arrhythmias such as atrial fibrillation.  She has been limiting her activity.     She developed a bronchitis approximately 10 to 15 days ago.  She had severe cough, but otherwise was feeling relatively well.     Her symptoms improved.  However, approximately a week ago, she began feeling ill with shortness of breath.  She got a Z-Gerson and started to improve.  However, approximately a week ago, has been developing shortness of breath.  She had general fatigue with a low energy level.  She got to the point where she would have to stop when going up steps.     She denied any chest pain or chest discomfort, except for the pain with coughing.     She saw Hien Whitaker, who noted that she was in atrial fibrillation, was sent to the emergency room.     In the emergency room, she was in atrial fibrillation with an RVR up in the 170 to 180 range.  Her enzymes were detectable at 15, but did not increase, and this morning her troponin was 14.  Her EKG did show atrial fibrillation with RVR with nonspecific ST-T changes.     She is normally active.  She does; however, have a strong family history of coronary artery disease, but there are only 2 siblings, 2 brothers, who both have had myocardial infarctions.  Her father  of cancer at a young age.     She was placed on a Cardizem infusion and given p.o. Cardizem.  The infusion has been stopped at 6 o'clock this morning.  Her rate is in the 100 to 120 range.     She had no PND, orthopnea, or pedal edema.  She is asymptomatic as far as her atrial fibrillation, cannot tell that she is in atrial fibrillation.     CARDIAC RISK FACTORS:  Other family members: Positive

## 2024-03-20 LAB
EKG ATRIAL RATE: 77 BPM
EKG P AXIS: 86 DEGREES
EKG P-R INTERVAL: 156 MS
EKG Q-T INTERVAL: 306 MS
EKG Q-T INTERVAL: 358 MS
EKG QRS DURATION: 78 MS
EKG QRS DURATION: 82 MS
EKG QTC CALCULATION (BAZETT): 405 MS
EKG QTC CALCULATION (BAZETT): 430 MS
EKG R AXIS: 125 DEGREES
EKG R AXIS: 125 DEGREES
EKG T AXIS: -44 DEGREES
EKG T AXIS: -94 DEGREES
EKG VENTRICULAR RATE: 119 BPM
EKG VENTRICULAR RATE: 77 BPM

## 2024-03-21 ENCOUNTER — TELEPHONE (OUTPATIENT)
Dept: CARDIOLOGY CLINIC | Age: 74
End: 2024-03-21

## 2024-03-21 DIAGNOSIS — I48.91 ATRIAL FIBRILLATION WITH RAPID VENTRICULAR RESPONSE (HCC): Primary | ICD-10-CM

## 2024-03-21 RX ORDER — ASPIRIN 81 MG/1
81 TABLET, CHEWABLE ORAL DAILY
Qty: 90 TABLET | Refills: 3 | Status: SHIPPED | OUTPATIENT
Start: 2024-03-21

## 2024-03-21 NOTE — TELEPHONE ENCOUNTER
Notified of cardiac event monitor strip with atrial flutter with slow rate. Will stop digoxin and diltiazem. Advised to continue amiodarone 200 mg daily and metoprolol. She denies any symptoms. Advised to call with any questions or concerns

## 2024-03-25 ENCOUNTER — TELEPHONE (OUTPATIENT)
Dept: CARDIOLOGY CLINIC | Age: 74
End: 2024-03-25

## 2024-03-25 DIAGNOSIS — I48.91 ATRIAL FIBRILLATION WITH RAPID VENTRICULAR RESPONSE (HCC): Primary | ICD-10-CM

## 2024-03-25 NOTE — TELEPHONE ENCOUNTER
Notified that cardioversion is scheduled for April 22nd at 8 AM. Will need to arrive at 7:45 AM. Reviewed cardioversion instructions with Clover. Questions answered, Verbalized understanding.

## 2024-03-29 NOTE — PROGRESS NOTES
Ov DR Linn follow up '  Post discharge with a fib  Started on eliquis  Will be doing cardioversion  First.  Unable to feel the a fib  Came in for a cough   And bronchitis.  No chest pain   C/o sob  Worse going up steps  Prior to cough and being   Ill did not have sob.    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         
EXAMINATION:  VITAL SIGNS:  Blood pressure was 140/80 with a heart rate of 90 and irregular, respiratory rate 18, O2 saturation 92%.  Weight 185 pounds.  GENERAL: This is a pleasant 74-year-old female.  Denies any pain.  She was oriented to person, place, and time.  Answered appropriately.  SKIN:  No unusual skin changes.  HEENT:  Pupils are equally round and intact.  Mucous membranes are dry.    NECK:  No JVD.  Good carotid pulses.  No bruits.    CARDIOVASCULAR:  S1 and S2 normal.  No S3 or S4.  Soft systolic murmur.  No diastolic murmur.  PMI was normal.  No lift, thrust, or pericardial friction rub.  LUNGS:  Clear to auscultation and percussion.  ABDOMEN:  Soft.  Good bowel sounds.  EXTREMITIES:  Good pedal pulses.  No pedal edema.  Skin is warm and dry.  No calf tenderness.  Nail beds pink.  Good cap refill.  PULSES:  Bilateral symmetrical radial, brachial, and carotid pulses.  No carotid bruits.  Good femoral and pedal pulses.  NEUROLOGIC:  Within normal limits.  PSYCHIATRIC:  Within normal limits.    LABORATORY DATA:  Sodium 142, potassium 4.1, BUN 18, creatinine 0.9, GFR greater than 60.  Magnesium 2.2.  ALT was 40, AST was 27.  TSH 3.892.  White count 7.1, hemoglobin 14.2 with a platelet count of 211,000.    EKG shows sinus rhythm, PACs, and possible old anterior myocardial infarction (just after the cardioversion).      Repeat EKG showed atrial fibrillation, again with nonspecific ST changes.    IMPRESSION:    Atrial fibrillation discovered.  Bronchitis in the first part of February.  Worsening shortness of breath.  Atrial fibrillation discovered by Hien Whitaker on February 26, 2024.  Markedly abnormal Lexiscan stress test with anterior apical ischemia.  Mild left ventricular dysfunction, ejection fraction of 45% with anterior wall hypokinesis.  On Eliquis 5 mg b.i.d.  Status post cardioversion on March 19, 2024, with her converting to sinus rhythm, but reverting back to atrial fibrillation again.  Placed

## 2024-04-05 ENCOUNTER — APPOINTMENT (OUTPATIENT)
Dept: GENERAL RADIOLOGY | Age: 74
DRG: 308 | End: 2024-04-05
Payer: MEDICARE

## 2024-04-05 ENCOUNTER — HOSPITAL ENCOUNTER (INPATIENT)
Age: 74
LOS: 3 days | Discharge: HOME | DRG: 308 | End: 2024-04-09
Attending: FAMILY MEDICINE | Admitting: INTERNAL MEDICINE
Payer: MEDICARE

## 2024-04-05 DIAGNOSIS — R79.89 ELEVATED TSH: ICD-10-CM

## 2024-04-05 DIAGNOSIS — R06.02 SHORTNESS OF BREATH: ICD-10-CM

## 2024-04-05 DIAGNOSIS — Z79.01 LONG TERM CURRENT USE OF ANTICOAGULANT: ICD-10-CM

## 2024-04-05 DIAGNOSIS — I48.91 ATRIAL FIBRILLATION WITH RVR (HCC): Primary | ICD-10-CM

## 2024-04-05 LAB
ANION GAP SERPL CALCULATED.3IONS-SCNC: 12 MMOL/L (ref 9–17)
BASOPHILS # BLD: 0.03 K/UL (ref 0–0.2)
BASOPHILS NFR BLD: 0 % (ref 0–2)
BNP SERPL-MCNC: 9392 PG/ML
BUN SERPL-MCNC: 28 MG/DL (ref 8–23)
BUN/CREAT SERPL: 23 (ref 9–20)
CALCIUM SERPL-MCNC: 10.3 MG/DL (ref 8.6–10.4)
CHLORIDE SERPL-SCNC: 102 MMOL/L (ref 98–107)
CO2 SERPL-SCNC: 23 MMOL/L (ref 20–31)
CREAT SERPL-MCNC: 1.2 MG/DL (ref 0.5–0.9)
EOSINOPHIL # BLD: 0.31 K/UL (ref 0–0.4)
EOSINOPHILS RELATIVE PERCENT: 4 % (ref 0–5)
ERYTHROCYTE [DISTWIDTH] IN BLOOD BY AUTOMATED COUNT: 14.3 % (ref 12.1–15.2)
GFR SERPL CREATININE-BSD FRML MDRD: 48 ML/MIN/1.73M2
GLUCOSE SERPL-MCNC: 123 MG/DL (ref 70–99)
HCT VFR BLD AUTO: 44.5 % (ref 36–46)
HGB BLD-MCNC: 15 G/DL (ref 12–16)
IMM GRANULOCYTES # BLD AUTO: 0.02 K/UL (ref 0–0.3)
IMM GRANULOCYTES NFR BLD: 0 % (ref 0–5)
LYMPHOCYTES NFR BLD: 2.55 K/UL (ref 1–4.8)
LYMPHOCYTES RELATIVE PERCENT: 32 % (ref 15–40)
MCH RBC QN AUTO: 29.2 PG (ref 26–34)
MCHC RBC AUTO-ENTMCNC: 33.7 G/DL (ref 31–37)
MCV RBC AUTO: 86.6 FL (ref 80–100)
MONOCYTES NFR BLD: 0.36 K/UL (ref 0–1)
MONOCYTES NFR BLD: 5 % (ref 4–8)
NEUTROPHILS NFR BLD: 59 % (ref 47–75)
NEUTS SEG NFR BLD: 4.79 K/UL (ref 2.5–7)
PLATELET # BLD AUTO: 209 K/UL (ref 140–450)
PMV BLD AUTO: 11.7 FL (ref 6–12)
POTASSIUM SERPL-SCNC: 4 MMOL/L (ref 3.7–5.3)
RBC # BLD AUTO: 5.14 M/UL (ref 4–5.2)
SODIUM SERPL-SCNC: 137 MMOL/L (ref 135–144)
TROPONIN I SERPL HS-MCNC: 19 NG/L (ref 0–14)
TSH SERPL DL<=0.05 MIU/L-ACNC: 28.98 UIU/ML (ref 0.3–5)
WBC OTHER # BLD: 8.1 K/UL (ref 3.5–11)

## 2024-04-05 PROCEDURE — 84439 ASSAY OF FREE THYROXINE: CPT

## 2024-04-05 PROCEDURE — 36415 COLL VENOUS BLD VENIPUNCTURE: CPT

## 2024-04-05 PROCEDURE — 96375 TX/PRO/DX INJ NEW DRUG ADDON: CPT

## 2024-04-05 PROCEDURE — 80048 BASIC METABOLIC PNL TOTAL CA: CPT

## 2024-04-05 PROCEDURE — 83880 ASSAY OF NATRIURETIC PEPTIDE: CPT

## 2024-04-05 PROCEDURE — 93005 ELECTROCARDIOGRAM TRACING: CPT | Performed by: FAMILY MEDICINE

## 2024-04-05 PROCEDURE — 84484 ASSAY OF TROPONIN QUANT: CPT

## 2024-04-05 PROCEDURE — 71045 X-RAY EXAM CHEST 1 VIEW: CPT

## 2024-04-05 PROCEDURE — 84443 ASSAY THYROID STIM HORMONE: CPT

## 2024-04-05 PROCEDURE — 96374 THER/PROPH/DIAG INJ IV PUSH: CPT

## 2024-04-05 PROCEDURE — 2500000003 HC RX 250 WO HCPCS: Performed by: FAMILY MEDICINE

## 2024-04-05 PROCEDURE — 85025 COMPLETE CBC W/AUTO DIFF WBC: CPT

## 2024-04-05 PROCEDURE — 99285 EMERGENCY DEPT VISIT HI MDM: CPT

## 2024-04-05 RX ORDER — DILTIAZEM HYDROCHLORIDE 5 MG/ML
10 INJECTION INTRAVENOUS ONCE
Status: COMPLETED | OUTPATIENT
Start: 2024-04-05 | End: 2024-04-05

## 2024-04-05 RX ORDER — METOPROLOL TARTRATE 1 MG/ML
5 INJECTION, SOLUTION INTRAVENOUS ONCE
Status: COMPLETED | OUTPATIENT
Start: 2024-04-05 | End: 2024-04-05

## 2024-04-05 RX ADMIN — DILTIAZEM HYDROCHLORIDE 10 MG: 5 INJECTION, SOLUTION INTRAVENOUS at 23:45

## 2024-04-05 RX ADMIN — METOPROLOL TARTRATE 5 MG: 5 INJECTION INTRAVENOUS at 23:01

## 2024-04-06 ENCOUNTER — APPOINTMENT (OUTPATIENT)
Dept: GENERAL RADIOLOGY | Age: 74
DRG: 308 | End: 2024-04-06
Payer: MEDICARE

## 2024-04-06 LAB
T4 FREE SERPL-MCNC: 1.3 NG/DL (ref 0.92–1.68)
TROPONIN I SERPL HS-MCNC: 16 NG/L (ref 0–14)
TROPONIN I SERPL HS-MCNC: 18 NG/L (ref 0–14)

## 2024-04-06 PROCEDURE — 2500000003 HC RX 250 WO HCPCS: Performed by: INTERNAL MEDICINE

## 2024-04-06 PROCEDURE — 6360000002 HC RX W HCPCS: Performed by: INTERNAL MEDICINE

## 2024-04-06 PROCEDURE — 71045 X-RAY EXAM CHEST 1 VIEW: CPT

## 2024-04-06 PROCEDURE — 1200000000 HC SEMI PRIVATE

## 2024-04-06 PROCEDURE — 6370000000 HC RX 637 (ALT 250 FOR IP): Performed by: INTERNAL MEDICINE

## 2024-04-06 PROCEDURE — 2580000003 HC RX 258: Performed by: INTERNAL MEDICINE

## 2024-04-06 PROCEDURE — 36415 COLL VENOUS BLD VENIPUNCTURE: CPT

## 2024-04-06 PROCEDURE — 94761 N-INVAS EAR/PLS OXIMETRY MLT: CPT

## 2024-04-06 RX ORDER — VITAMIN B COMPLEX
1000 TABLET ORAL DAILY
Status: DISCONTINUED | OUTPATIENT
Start: 2024-04-06 | End: 2024-04-09 | Stop reason: HOSPADM

## 2024-04-06 RX ORDER — BUSPIRONE HYDROCHLORIDE 15 MG/1
15 TABLET ORAL 2 TIMES DAILY
Status: DISCONTINUED | OUTPATIENT
Start: 2024-04-06 | End: 2024-04-09 | Stop reason: HOSPADM

## 2024-04-06 RX ORDER — FUROSEMIDE 20 MG/1
20 TABLET ORAL DAILY
Status: DISCONTINUED | OUTPATIENT
Start: 2024-04-06 | End: 2024-04-07

## 2024-04-06 RX ORDER — ASPIRIN 81 MG/1
81 TABLET, CHEWABLE ORAL DAILY
Status: DISCONTINUED | OUTPATIENT
Start: 2024-04-06 | End: 2024-04-09 | Stop reason: HOSPADM

## 2024-04-06 RX ORDER — AMIODARONE HYDROCHLORIDE 200 MG/1
200 TABLET ORAL DAILY
Status: DISCONTINUED | OUTPATIENT
Start: 2024-04-06 | End: 2024-04-06

## 2024-04-06 RX ORDER — FUROSEMIDE 10 MG/ML
20 INJECTION INTRAMUSCULAR; INTRAVENOUS ONCE
Status: COMPLETED | OUTPATIENT
Start: 2024-04-06 | End: 2024-04-06

## 2024-04-06 RX ORDER — LISINOPRIL 20 MG/1
20 TABLET ORAL DAILY
Status: DISCONTINUED | OUTPATIENT
Start: 2024-04-06 | End: 2024-04-09

## 2024-04-06 RX ORDER — SODIUM CHLORIDE 0.9 % (FLUSH) 0.9 %
5-40 SYRINGE (ML) INJECTION EVERY 12 HOURS SCHEDULED
Status: DISCONTINUED | OUTPATIENT
Start: 2024-04-06 | End: 2024-04-09 | Stop reason: HOSPADM

## 2024-04-06 RX ORDER — LISINOPRIL 20 MG/1
20 TABLET ORAL DAILY
Status: DISCONTINUED | OUTPATIENT
Start: 2024-04-06 | End: 2024-04-06

## 2024-04-06 RX ORDER — VENLAFAXINE HYDROCHLORIDE 37.5 MG/1
75 CAPSULE, EXTENDED RELEASE ORAL
Status: DISCONTINUED | OUTPATIENT
Start: 2024-04-06 | End: 2024-04-09 | Stop reason: HOSPADM

## 2024-04-06 RX ORDER — POLYETHYLENE GLYCOL 3350 17 G/17G
17 POWDER, FOR SOLUTION ORAL DAILY PRN
Status: DISCONTINUED | OUTPATIENT
Start: 2024-04-06 | End: 2024-04-09 | Stop reason: HOSPADM

## 2024-04-06 RX ORDER — AMIODARONE HYDROCHLORIDE 200 MG/1
200 TABLET ORAL DAILY
Status: DISCONTINUED | OUTPATIENT
Start: 2024-04-06 | End: 2024-04-09 | Stop reason: HOSPADM

## 2024-04-06 RX ORDER — ONDANSETRON 2 MG/ML
4 INJECTION INTRAMUSCULAR; INTRAVENOUS EVERY 6 HOURS PRN
Status: DISCONTINUED | OUTPATIENT
Start: 2024-04-06 | End: 2024-04-09 | Stop reason: HOSPADM

## 2024-04-06 RX ORDER — LEVOTHYROXINE SODIUM 112 UG/1
112 TABLET ORAL DAILY
Status: DISCONTINUED | OUTPATIENT
Start: 2024-04-06 | End: 2024-04-07

## 2024-04-06 RX ORDER — ACETAMINOPHEN 325 MG/1
650 TABLET ORAL EVERY 6 HOURS PRN
Status: DISCONTINUED | OUTPATIENT
Start: 2024-04-06 | End: 2024-04-09 | Stop reason: HOSPADM

## 2024-04-06 RX ORDER — ATORVASTATIN CALCIUM 20 MG/1
20 TABLET, FILM COATED ORAL DAILY
Status: DISCONTINUED | OUTPATIENT
Start: 2024-04-06 | End: 2024-04-09 | Stop reason: HOSPADM

## 2024-04-06 RX ORDER — DILTIAZEM HYDROCHLORIDE 5 MG/ML
10 INJECTION INTRAVENOUS EVERY 6 HOURS PRN
Status: DISCONTINUED | OUTPATIENT
Start: 2024-04-06 | End: 2024-04-09 | Stop reason: HOSPADM

## 2024-04-06 RX ORDER — POTASSIUM CHLORIDE 750 MG/1
40 TABLET, FILM COATED, EXTENDED RELEASE ORAL PRN
Status: DISCONTINUED | OUTPATIENT
Start: 2024-04-06 | End: 2024-04-09 | Stop reason: HOSPADM

## 2024-04-06 RX ORDER — ACETAMINOPHEN 650 MG/1
650 SUPPOSITORY RECTAL EVERY 6 HOURS PRN
Status: DISCONTINUED | OUTPATIENT
Start: 2024-04-06 | End: 2024-04-09 | Stop reason: HOSPADM

## 2024-04-06 RX ORDER — DILTIAZEM HYDROCHLORIDE 120 MG/1
120 CAPSULE, COATED, EXTENDED RELEASE ORAL ONCE
Status: COMPLETED | OUTPATIENT
Start: 2024-04-06 | End: 2024-04-06

## 2024-04-06 RX ORDER — ONDANSETRON 4 MG/1
4 TABLET, ORALLY DISINTEGRATING ORAL EVERY 8 HOURS PRN
Status: DISCONTINUED | OUTPATIENT
Start: 2024-04-06 | End: 2024-04-09 | Stop reason: HOSPADM

## 2024-04-06 RX ORDER — POTASSIUM CHLORIDE 7.45 MG/ML
10 INJECTION INTRAVENOUS PRN
Status: DISCONTINUED | OUTPATIENT
Start: 2024-04-06 | End: 2024-04-09 | Stop reason: HOSPADM

## 2024-04-06 RX ORDER — SODIUM CHLORIDE 9 MG/ML
INJECTION, SOLUTION INTRAVENOUS PRN
Status: DISCONTINUED | OUTPATIENT
Start: 2024-04-06 | End: 2024-04-09 | Stop reason: HOSPADM

## 2024-04-06 RX ORDER — SODIUM CHLORIDE 0.9 % (FLUSH) 0.9 %
5-40 SYRINGE (ML) INJECTION PRN
Status: DISCONTINUED | OUTPATIENT
Start: 2024-04-06 | End: 2024-04-09 | Stop reason: HOSPADM

## 2024-04-06 RX ORDER — MAGNESIUM SULFATE IN WATER 40 MG/ML
2000 INJECTION, SOLUTION INTRAVENOUS PRN
Status: DISCONTINUED | OUTPATIENT
Start: 2024-04-06 | End: 2024-04-09 | Stop reason: HOSPADM

## 2024-04-06 RX ADMIN — ATORVASTATIN CALCIUM 20 MG: 20 TABLET, FILM COATED ORAL at 20:35

## 2024-04-06 RX ADMIN — LEVOTHYROXINE SODIUM 112 MCG: 0.11 TABLET ORAL at 05:00

## 2024-04-06 RX ADMIN — DILTIAZEM HYDROCHLORIDE 10 MG: 5 INJECTION, SOLUTION INTRAVENOUS at 20:35

## 2024-04-06 RX ADMIN — DILTIAZEM HYDROCHLORIDE 120 MG: 120 CAPSULE, COATED, EXTENDED RELEASE ORAL at 12:30

## 2024-04-06 RX ADMIN — AMIODARONE HYDROCHLORIDE 200 MG: 200 TABLET ORAL at 09:00

## 2024-04-06 RX ADMIN — CHOLECALCIFEROL TAB 25 MCG (1000 UNIT) 1000 UNITS: 25 TAB at 09:00

## 2024-04-06 RX ADMIN — FUROSEMIDE 20 MG: 10 INJECTION, SOLUTION INTRAMUSCULAR; INTRAVENOUS at 02:58

## 2024-04-06 RX ADMIN — METOPROLOL TARTRATE 12.5 MG: 25 TABLET, FILM COATED ORAL at 15:24

## 2024-04-06 RX ADMIN — FUROSEMIDE 20 MG: 20 TABLET ORAL at 12:30

## 2024-04-06 RX ADMIN — DILTIAZEM HYDROCHLORIDE 10 MG: 5 INJECTION, SOLUTION INTRAVENOUS at 06:05

## 2024-04-06 RX ADMIN — SODIUM CHLORIDE, PRESERVATIVE FREE 10 ML: 5 INJECTION INTRAVENOUS at 20:36

## 2024-04-06 RX ADMIN — DILTIAZEM HYDROCHLORIDE 30 MG: 30 TABLET, FILM COATED ORAL at 23:07

## 2024-04-06 RX ADMIN — BUSPIRONE HYDROCHLORIDE 15 MG: 15 TABLET ORAL at 09:00

## 2024-04-06 RX ADMIN — ASPIRIN 81 MG 81 MG: 81 TABLET ORAL at 09:00

## 2024-04-06 RX ADMIN — APIXABAN 5 MG: 5 TABLET, FILM COATED ORAL at 09:00

## 2024-04-06 RX ADMIN — DILTIAZEM HYDROCHLORIDE 30 MG: 30 TABLET, FILM COATED ORAL at 04:50

## 2024-04-06 RX ADMIN — VENLAFAXINE HYDROCHLORIDE 75 MG: 37.5 CAPSULE, EXTENDED RELEASE ORAL at 08:59

## 2024-04-06 RX ADMIN — APIXABAN 5 MG: 5 TABLET, FILM COATED ORAL at 20:35

## 2024-04-06 RX ADMIN — SODIUM CHLORIDE, PRESERVATIVE FREE 10 ML: 5 INJECTION INTRAVENOUS at 08:59

## 2024-04-06 RX ADMIN — BUSPIRONE HYDROCHLORIDE 15 MG: 15 TABLET ORAL at 20:35

## 2024-04-06 ASSESSMENT — PAIN - FUNCTIONAL ASSESSMENT
PAIN_FUNCTIONAL_ASSESSMENT: NONE - DENIES PAIN
PAIN_FUNCTIONAL_ASSESSMENT: NONE - DENIES PAIN

## 2024-04-06 NOTE — ED PROVIDER NOTES
[facility]  EMERGENCY DEPARTMENT ENCOUNTER      Pt Name: Clover Barry  MRN: [id number]  Birthdate 1950  Date of evaluation: 4/5/2024  Provider: Wayne Bobby MD    CHIEF COMPLAINT       Chief Complaint   Patient presents with    Shortness of Breath     Patient presents to the ER with worsening SOB for last few days         HISTORY OF PRESENT ILLNESS      Clover Barry is a 74 y.o. female who presents to the emergency department to the emergency room via private vehicle with daughter, patient complaining of some difficulty breathing/shortness of breath, worsening over the past few days.  Patient has known atrial fibrillation, states has been compliant with all her medications, has had cardioversion recently is scheduled again for cardioversion in the next few weeks.  Patient denies any excessive stimulant use and states she avoids coffee or other caffeinated products.  Denies any chest pain.    PCP: BEATRIZ Whitaker  Cardiology: Dr. Linn        REVIEW OF SYSTEMS       Review of Systems   Respiratory:  Positive for shortness of breath.    All other systems reviewed and are negative.        PAST MEDICAL HISTORY     Past Medical History:   Diagnosis Date    A-fib (HCC) 02/26/2024    Anxiety     Hyperlipidemia     Hypertension     Hypothyroidism          SURGICAL HISTORY       Past Surgical History:   Procedure Laterality Date    HYSTERECTOMY (CERVIX STATUS UNKNOWN)           CURRENT MEDICATIONS       Previous Medications    AMIODARONE (CORDARONE) 200 MG TABLET    Take 1 tablet by mouth daily    APIXABAN (ELIQUIS) 5 MG TABS TABLET    Take 1 tablet by mouth 2 times daily    ASPIRIN 81 MG CHEWABLE TABLET    Take 1 tablet by mouth daily    ATORVASTATIN (LIPITOR) 20 MG TABLET    Take 1 tablet by mouth every 24 hours    BUDESONIDE-FORMOTEROL (SYMBICORT) 160-4.5 MCG/ACT AERO    Inhale 2 puffs into the lungs 2 times daily    BUSPIRONE (BUSPAR) 15 MG TABLET    Take 15 mg by mouth in the morning and at

## 2024-04-06 NOTE — ED NOTES
Patient walked through ER. Patient maintained SpO2 of greater then 92%. Patient stated that she felt SOB but SpO2 was 96%. Physician made aware.

## 2024-04-06 NOTE — FLOWSHEET NOTE
04/06/24 1035   Treatment Team Notification   Reason for Communication Review case   Name of Team Member Notified Dr. Linn   Treatment Team Role Consulting Provider   Method of Communication Call   Response No new orders   Notification Time 1036     Notified of consult. Clarified with Dr. Linn, he will be in Monday to see patient. Updated on VS, hold to lisinopril and metoprolol orders; voices understanding.    no

## 2024-04-06 NOTE — ED TRIAGE NOTES
Patient presents to the ER with history of afib. Patient states she is getting cardiac cath done in May. Patient reached out to Dr Roberto but has not heard back from him. Patient has had increased SOB the last week. Patient states she is having difficulty with sleeping as well.

## 2024-04-06 NOTE — H&P
History & Physical    Patient:  Clover Barry  YOB: 1950  Date of Service: 4/6/2024  MRN: [id number]   Acct:   [id number]   Primary Care Physician: Hien Whitaker APRN - CNP    Chief Complaint:   Chief Complaint   Patient presents with    Shortness of Breath     Patient presents to the ER with worsening SOB for last few days       History of Present Illness:     History obtained from chart review and the patient.    The patient is a 74 y.o. with history of atrial fibrillation, hypertension, hyperlipidemia, abnormal Lexiscan stress test, anxiety disorder, hypothyroidism presented to the emergency room complaining of worsening shortness of breath started about 1 week ago and progressively worsening.  She states any minimal activity would make her feel out of breath.  She noticed that laying down was making her shortness of breath worse which was moderate to severe in intensity.  She had associated nonproductive cough.  She reports no chest pain, no palpitations, no dizziness.  States sometimes did feel sweaty and nauseous, no vomiting.  She has a history of atrial fibrillation and follows up with Dr. Linn.  She was hospitalized at our facility from 2/26/2024 - 2/28/2024 for A-fib with RVR.  Was on Cardizem drip in ICU and was started on Eliquis.  Nuclear stress test showed an anterior defect with the concerns of anterior ischemia.  She was discharged home with Cardizem, Lopressor, digoxin and Eliquis as well as event monitor.  She followed up with Dr. Linn.  Underwent cardioversion 3/19/24 and converted to sinus rhythm but reverted back to A-fib.  Was placed on amiodarone 200 mg daily.  Patient states that she has been compliant with her medications.  Workup in the emergency room revealed heart rate of 165, blood pressure 139/115, oxygen saturation 94% on room air.  She was afebrile.  BMP was unremarkable except elevated BUN 28, creatinine 1.2.  proBNP was 9, 392, troponin 18, followed by 19.

## 2024-04-06 NOTE — FLOWSHEET NOTE
04/06/24 1456   Treatment Team Notification   Reason for Communication Abnormal vitals;Review case;Medication concern   Name of Team Member Notified Dr. Mccarty   Treatment Team Role Attending Provider   Method of Communication Secure Message   Response See orders   Notification Time 0287     Notified hospitalist of intermittent episodes of heart rate in 140-150's for only a second or two, then back down to 120's. Ok to give AM metoprolol that was previously held.

## 2024-04-07 ENCOUNTER — APPOINTMENT (OUTPATIENT)
Dept: CT IMAGING | Age: 74
DRG: 308 | End: 2024-04-07
Payer: MEDICARE

## 2024-04-07 LAB
ALBUMIN SERPL-MCNC: 3.8 G/DL (ref 3.5–5.2)
ALP SERPL-CCNC: 106 U/L (ref 35–104)
ALT SERPL-CCNC: 30 U/L (ref 5–33)
ANION GAP SERPL CALCULATED.3IONS-SCNC: 10 MMOL/L (ref 9–17)
AST SERPL-CCNC: 19 U/L
BILIRUB DIRECT SERPL-MCNC: <0.1 MG/DL
BILIRUB INDIRECT SERPL-MCNC: ABNORMAL MG/DL (ref 0–1)
BILIRUB SERPL-MCNC: 0.8 MG/DL (ref 0.3–1.2)
BUN SERPL-MCNC: 26 MG/DL (ref 8–23)
BUN/CREAT SERPL: 24 (ref 9–20)
CALCIUM SERPL-MCNC: 9.2 MG/DL (ref 8.6–10.4)
CHLORIDE SERPL-SCNC: 103 MMOL/L (ref 98–107)
CO2 SERPL-SCNC: 26 MMOL/L (ref 20–31)
CREAT SERPL-MCNC: 1.1 MG/DL (ref 0.5–0.9)
GFR SERPL CREATININE-BSD FRML MDRD: 53 ML/MIN/1.73M2
GLUCOSE SERPL-MCNC: 116 MG/DL (ref 70–99)
POTASSIUM SERPL-SCNC: 3.9 MMOL/L (ref 3.7–5.3)
PROT SERPL-MCNC: 6.4 G/DL (ref 6.4–8.3)
SODIUM SERPL-SCNC: 139 MMOL/L (ref 135–144)

## 2024-04-07 PROCEDURE — 71250 CT THORAX DX C-: CPT

## 2024-04-07 PROCEDURE — 6360000002 HC RX W HCPCS: Performed by: INTERNAL MEDICINE

## 2024-04-07 PROCEDURE — 2500000003 HC RX 250 WO HCPCS: Performed by: INTERNAL MEDICINE

## 2024-04-07 PROCEDURE — 94761 N-INVAS EAR/PLS OXIMETRY MLT: CPT

## 2024-04-07 PROCEDURE — 6370000000 HC RX 637 (ALT 250 FOR IP): Performed by: INTERNAL MEDICINE

## 2024-04-07 PROCEDURE — 1200000000 HC SEMI PRIVATE

## 2024-04-07 PROCEDURE — 36415 COLL VENOUS BLD VENIPUNCTURE: CPT

## 2024-04-07 PROCEDURE — 80048 BASIC METABOLIC PNL TOTAL CA: CPT

## 2024-04-07 PROCEDURE — 80076 HEPATIC FUNCTION PANEL: CPT

## 2024-04-07 PROCEDURE — 93005 ELECTROCARDIOGRAM TRACING: CPT | Performed by: INTERNAL MEDICINE

## 2024-04-07 PROCEDURE — 2580000003 HC RX 258: Performed by: INTERNAL MEDICINE

## 2024-04-07 RX ORDER — FUROSEMIDE 10 MG/ML
40 INJECTION INTRAMUSCULAR; INTRAVENOUS 2 TIMES DAILY
Status: DISCONTINUED | OUTPATIENT
Start: 2024-04-07 | End: 2024-04-09

## 2024-04-07 RX ORDER — DILTIAZEM HYDROCHLORIDE 180 MG/1
180 CAPSULE, COATED, EXTENDED RELEASE ORAL DAILY
Status: DISCONTINUED | OUTPATIENT
Start: 2024-04-07 | End: 2024-04-07

## 2024-04-07 RX ADMIN — LEVOTHYROXINE SODIUM 112 MCG: 0.11 TABLET ORAL at 05:18

## 2024-04-07 RX ADMIN — DILTIAZEM HYDROCHLORIDE 30 MG: 30 TABLET, FILM COATED ORAL at 05:18

## 2024-04-07 RX ADMIN — BUSPIRONE HYDROCHLORIDE 15 MG: 15 TABLET ORAL at 21:43

## 2024-04-07 RX ADMIN — SODIUM CHLORIDE, PRESERVATIVE FREE 10 ML: 5 INJECTION INTRAVENOUS at 08:11

## 2024-04-07 RX ADMIN — APIXABAN 5 MG: 5 TABLET, FILM COATED ORAL at 08:11

## 2024-04-07 RX ADMIN — SODIUM CHLORIDE, PRESERVATIVE FREE 10 ML: 5 INJECTION INTRAVENOUS at 21:52

## 2024-04-07 RX ADMIN — DILTIAZEM HYDROCHLORIDE 30 MG: 30 TABLET, FILM COATED ORAL at 18:42

## 2024-04-07 RX ADMIN — ASPIRIN 81 MG 81 MG: 81 TABLET ORAL at 08:11

## 2024-04-07 RX ADMIN — ATORVASTATIN CALCIUM 20 MG: 20 TABLET, FILM COATED ORAL at 21:44

## 2024-04-07 RX ADMIN — CHOLECALCIFEROL TAB 25 MCG (1000 UNIT) 1000 UNITS: 25 TAB at 08:11

## 2024-04-07 RX ADMIN — METOPROLOL TARTRATE 12.5 MG: 25 TABLET, FILM COATED ORAL at 21:44

## 2024-04-07 RX ADMIN — METOPROLOL TARTRATE 12.5 MG: 25 TABLET, FILM COATED ORAL at 08:11

## 2024-04-07 RX ADMIN — DILTIAZEM HYDROCHLORIDE 10 MG: 5 INJECTION, SOLUTION INTRAVENOUS at 21:44

## 2024-04-07 RX ADMIN — FUROSEMIDE 40 MG: 10 INJECTION, SOLUTION INTRAMUSCULAR; INTRAVENOUS at 18:42

## 2024-04-07 RX ADMIN — DILTIAZEM HYDROCHLORIDE 30 MG: 30 TABLET, FILM COATED ORAL at 12:56

## 2024-04-07 RX ADMIN — VENLAFAXINE HYDROCHLORIDE 75 MG: 37.5 CAPSULE, EXTENDED RELEASE ORAL at 08:11

## 2024-04-07 RX ADMIN — BUSPIRONE HYDROCHLORIDE 15 MG: 15 TABLET ORAL at 08:11

## 2024-04-07 RX ADMIN — FUROSEMIDE 40 MG: 10 INJECTION, SOLUTION INTRAMUSCULAR; INTRAVENOUS at 08:10

## 2024-04-07 RX ADMIN — AMIODARONE HYDROCHLORIDE 200 MG: 200 TABLET ORAL at 08:11

## 2024-04-07 RX ADMIN — APIXABAN 5 MG: 5 TABLET, FILM COATED ORAL at 21:44

## 2024-04-07 ASSESSMENT — PAIN - FUNCTIONAL ASSESSMENT
PAIN_FUNCTIONAL_ASSESSMENT: NONE - DENIES PAIN
PAIN_FUNCTIONAL_ASSESSMENT: NONE - DENIES PAIN

## 2024-04-07 NOTE — FLOWSHEET NOTE
04/07/24 1153   Treatment Team Notification   Reason for Communication Review case;Medication concern   Name of Team Member Notified Dr. Linn   Treatment Team Role Consulting Provider   Method of Communication Secure Message   Response See orders   Notification Time 1154     Reviewed VS, updated medications with Dr. Linn. Reviewed TSH, he wishes for Dr. Mccarty to make adjustments to synthroid as needed. Awaiting CT results. Ok to continue metoprolol at 12.5 mg dose, continue short acting Cardizem with hold parameter for HR<110 bpm. Dr. Mccarty updated.

## 2024-04-07 NOTE — CONSULTS
[facility]                [address] [id number]                              CONSULTATION      PATIENT NAME: LOBO BLANK             : 1950  MED REC NO: [id number]                          ROOM: 0269  ACCOUNT NO: [id number]                       ADMIT DATE: 2024  PROVIDER: Humberto Linn MD      REASON FOR CONSULT:    1. Atrial fibrillation with RVR.  2. Marked shortness of breath because of RVR.    HISTORY OF PRESENT ILLNESS:  The patient is a 74-year-old female who developed bronchitis in the 1st week of February.  Symptoms initially improved, but on 2024, began feeling ill with more shortness of breath and loss of energy.    She saw Hien Whitaker on 2024, noted she was in atrial fibrillation and sent to the emergency room.  She was hospitalized because of her rapid ventricular response up in the 170 to 180 range.    We anticoagulated her with Eliquis and arranged to have a cardioversion on 2024.    She did have a stress test on 2024, which showed an abnormality in the anteroseptal segments with a high risk of coronary artery disease.  This has been discussed, and we are planning on proceeding with a cardiac catheterization on May 17, 2024.  We offered to do it earlier.  However, she has a trip planned that she would like to do before she has the cardiac catheterization.    She did have an echocardiogram on 2024, that showed anterior wall hypokinesis with an EF of 45% to 50%.    We did the cardioversion on , and she converted to sinus rhythm.  However, went back into atrial fibrillation quickly; and therefore, we started amiodarone 200 mg daily with a plan on doing a cardioversion in 4 weeks.    She came to the emergency room on  because of increasing shortness of breath.  In the emergency room, an EKG showed atrial flutter with 2:1 conduction.  She was initially

## 2024-04-08 LAB
ANION GAP SERPL CALCULATED.3IONS-SCNC: 12 MMOL/L (ref 9–17)
BUN SERPL-MCNC: 25 MG/DL (ref 8–23)
BUN/CREAT SERPL: 23 (ref 9–20)
CALCIUM SERPL-MCNC: 9.3 MG/DL (ref 8.6–10.4)
CHLORIDE SERPL-SCNC: 102 MMOL/L (ref 98–107)
CO2 SERPL-SCNC: 25 MMOL/L (ref 20–31)
CREAT SERPL-MCNC: 1.1 MG/DL (ref 0.5–0.9)
EKG ATRIAL RATE: 144 BPM
EKG ATRIAL RATE: 83 BPM
EKG P AXIS: 63 DEGREES
EKG P-R INTERVAL: 144 MS
EKG P-R INTERVAL: 170 MS
EKG Q-T INTERVAL: 254 MS
EKG Q-T INTERVAL: 330 MS
EKG Q-T INTERVAL: 390 MS
EKG Q-T INTERVAL: 396 MS
EKG QRS DURATION: 74 MS
EKG QRS DURATION: 80 MS
EKG QRS DURATION: 82 MS
EKG QRS DURATION: 82 MS
EKG QTC CALCULATION (BAZETT): 422 MS
EKG QTC CALCULATION (BAZETT): 456 MS
EKG QTC CALCULATION (BAZETT): 465 MS
EKG QTC CALCULATION (BAZETT): 515 MS
EKG R AXIS: 131 DEGREES
EKG R AXIS: 141 DEGREES
EKG R AXIS: 145 DEGREES
EKG R AXIS: 148 DEGREES
EKG T AXIS: -117 DEGREES
EKG T AXIS: -120 DEGREES
EKG T AXIS: -149 DEGREES
EKG T AXIS: 122 DEGREES
EKG VENTRICULAR RATE: 105 BPM
EKG VENTRICULAR RATE: 115 BPM
EKG VENTRICULAR RATE: 166 BPM
EKG VENTRICULAR RATE: 83 BPM
GFR SERPL CREATININE-BSD FRML MDRD: 53 ML/MIN/1.73M2
GLUCOSE SERPL-MCNC: 120 MG/DL (ref 70–99)
MAGNESIUM SERPL-MCNC: 2.3 MG/DL (ref 1.6–2.6)
POTASSIUM SERPL-SCNC: 3.9 MMOL/L (ref 3.7–5.3)
SODIUM SERPL-SCNC: 139 MMOL/L (ref 135–144)

## 2024-04-08 PROCEDURE — 94761 N-INVAS EAR/PLS OXIMETRY MLT: CPT

## 2024-04-08 PROCEDURE — 80048 BASIC METABOLIC PNL TOTAL CA: CPT

## 2024-04-08 PROCEDURE — 93010 ELECTROCARDIOGRAM REPORT: CPT | Performed by: INTERNAL MEDICINE

## 2024-04-08 PROCEDURE — 1200000000 HC SEMI PRIVATE

## 2024-04-08 PROCEDURE — 36415 COLL VENOUS BLD VENIPUNCTURE: CPT

## 2024-04-08 PROCEDURE — 2580000003 HC RX 258: Performed by: INTERNAL MEDICINE

## 2024-04-08 PROCEDURE — 83735 ASSAY OF MAGNESIUM: CPT

## 2024-04-08 PROCEDURE — 6370000000 HC RX 637 (ALT 250 FOR IP): Performed by: INTERNAL MEDICINE

## 2024-04-08 PROCEDURE — 93005 ELECTROCARDIOGRAM TRACING: CPT | Performed by: INTERNAL MEDICINE

## 2024-04-08 PROCEDURE — 6360000002 HC RX W HCPCS: Performed by: INTERNAL MEDICINE

## 2024-04-08 RX ORDER — PROPOFOL 10 MG/ML
INJECTION, EMULSION INTRAVENOUS CONTINUOUS PRN
Status: COMPLETED | OUTPATIENT
Start: 2024-04-08 | End: 2024-04-08

## 2024-04-08 RX ADMIN — METOPROLOL TARTRATE 12.5 MG: 25 TABLET, FILM COATED ORAL at 21:00

## 2024-04-08 RX ADMIN — SODIUM CHLORIDE, PRESERVATIVE FREE 10 ML: 5 INJECTION INTRAVENOUS at 21:01

## 2024-04-08 RX ADMIN — PROPOFOL 140 MG: 10 INJECTION, EMULSION INTRAVENOUS at 12:20

## 2024-04-08 RX ADMIN — CHOLECALCIFEROL TAB 25 MCG (1000 UNIT) 1000 UNITS: 25 TAB at 13:38

## 2024-04-08 RX ADMIN — LEVOTHYROXINE SODIUM 150 MCG: 25 TABLET ORAL at 06:13

## 2024-04-08 RX ADMIN — AMIODARONE HYDROCHLORIDE 200 MG: 200 TABLET ORAL at 13:38

## 2024-04-08 RX ADMIN — APIXABAN 5 MG: 5 TABLET, FILM COATED ORAL at 21:00

## 2024-04-08 RX ADMIN — DILTIAZEM HYDROCHLORIDE 30 MG: 30 TABLET, FILM COATED ORAL at 06:13

## 2024-04-08 RX ADMIN — BUSPIRONE HYDROCHLORIDE 15 MG: 15 TABLET ORAL at 21:00

## 2024-04-08 RX ADMIN — ATORVASTATIN CALCIUM 20 MG: 20 TABLET, FILM COATED ORAL at 21:00

## 2024-04-08 RX ADMIN — FUROSEMIDE 40 MG: 10 INJECTION, SOLUTION INTRAMUSCULAR; INTRAVENOUS at 17:38

## 2024-04-08 RX ADMIN — VENLAFAXINE HYDROCHLORIDE 75 MG: 37.5 CAPSULE, EXTENDED RELEASE ORAL at 13:37

## 2024-04-08 RX ADMIN — APIXABAN 5 MG: 5 TABLET, FILM COATED ORAL at 13:38

## 2024-04-08 RX ADMIN — METOPROLOL TARTRATE 12.5 MG: 25 TABLET, FILM COATED ORAL at 13:39

## 2024-04-08 RX ADMIN — SODIUM CHLORIDE, PRESERVATIVE FREE 10 ML: 5 INJECTION INTRAVENOUS at 13:43

## 2024-04-08 RX ADMIN — DILTIAZEM HYDROCHLORIDE 30 MG: 30 TABLET, FILM COATED ORAL at 23:48

## 2024-04-08 RX ADMIN — BUSPIRONE HYDROCHLORIDE 15 MG: 15 TABLET ORAL at 13:38

## 2024-04-08 RX ADMIN — ASPIRIN 81 MG 81 MG: 81 TABLET ORAL at 13:38

## 2024-04-08 RX ADMIN — DILTIAZEM HYDROCHLORIDE 30 MG: 30 TABLET, FILM COATED ORAL at 01:04

## 2024-04-08 RX ADMIN — FUROSEMIDE 40 MG: 10 INJECTION, SOLUTION INTRAMUSCULAR; INTRAVENOUS at 13:37

## 2024-04-08 NOTE — PRE SEDATION
Sedation Plan  ASA: class 1 - normal, healthy patient     Mallampati class: I - soft palate, uvula, fauces, pillars visible.    Sedation plan: deep/analgesia    Risks, benefits, and alternatives discussed with patient.        Immediate reassessment prior to sedation:  Patient's status reviewed and vital signs assessed; acceptable to perform procedure and proceed to administer sedation as planned.

## 2024-04-08 NOTE — CARE COORDINATION
Representative Agree with the Discharge Plan? (P) Yes    Lázaro Hernandez RN  Case Management Department  Ph: [phone] Fax: [phone]

## 2024-04-09 VITALS
OXYGEN SATURATION: 98 % | WEIGHT: 190.3 LBS | HEART RATE: 62 BPM | RESPIRATION RATE: 16 BRPM | HEIGHT: 67 IN | BODY MASS INDEX: 29.87 KG/M2 | SYSTOLIC BLOOD PRESSURE: 116 MMHG | DIASTOLIC BLOOD PRESSURE: 60 MMHG | TEMPERATURE: 97.5 F

## 2024-04-09 LAB
ANION GAP SERPL CALCULATED.3IONS-SCNC: 10 MMOL/L (ref 9–17)
BUN SERPL-MCNC: 29 MG/DL (ref 8–23)
BUN/CREAT SERPL: 24 (ref 9–20)
CALCIUM SERPL-MCNC: 9.6 MG/DL (ref 8.6–10.4)
CHLORIDE SERPL-SCNC: 101 MMOL/L (ref 98–107)
CO2 SERPL-SCNC: 27 MMOL/L (ref 20–31)
CREAT SERPL-MCNC: 1.2 MG/DL (ref 0.5–0.9)
GFR SERPL CREATININE-BSD FRML MDRD: 48 ML/MIN/1.73M2
GLUCOSE SERPL-MCNC: 112 MG/DL (ref 70–99)
POTASSIUM SERPL-SCNC: 3.9 MMOL/L (ref 3.7–5.3)
SODIUM SERPL-SCNC: 138 MMOL/L (ref 135–144)

## 2024-04-09 PROCEDURE — 94761 N-INVAS EAR/PLS OXIMETRY MLT: CPT

## 2024-04-09 PROCEDURE — 6370000000 HC RX 637 (ALT 250 FOR IP): Performed by: INTERNAL MEDICINE

## 2024-04-09 PROCEDURE — 2580000003 HC RX 258: Performed by: INTERNAL MEDICINE

## 2024-04-09 PROCEDURE — 93005 ELECTROCARDIOGRAM TRACING: CPT | Performed by: INTERNAL MEDICINE

## 2024-04-09 PROCEDURE — 36415 COLL VENOUS BLD VENIPUNCTURE: CPT

## 2024-04-09 PROCEDURE — 80048 BASIC METABOLIC PNL TOTAL CA: CPT

## 2024-04-09 RX ORDER — LEVOTHYROXINE SODIUM 0.12 MG/1
125 TABLET ORAL DAILY
Qty: 30 TABLET | Refills: 5 | Status: SHIPPED | OUTPATIENT
Start: 2024-04-09

## 2024-04-09 RX ORDER — LISINOPRIL AND HYDROCHLOROTHIAZIDE 12.5; 1 MG/1; MG/1
1 TABLET ORAL DAILY
Qty: 30 TABLET | Refills: 3 | Status: SHIPPED | OUTPATIENT
Start: 2024-04-09

## 2024-04-09 RX ADMIN — APIXABAN 5 MG: 5 TABLET, FILM COATED ORAL at 09:08

## 2024-04-09 RX ADMIN — ASPIRIN 81 MG 81 MG: 81 TABLET ORAL at 09:08

## 2024-04-09 RX ADMIN — BUSPIRONE HYDROCHLORIDE 15 MG: 15 TABLET ORAL at 09:08

## 2024-04-09 RX ADMIN — VENLAFAXINE HYDROCHLORIDE 75 MG: 37.5 CAPSULE, EXTENDED RELEASE ORAL at 09:09

## 2024-04-09 RX ADMIN — CHOLECALCIFEROL TAB 25 MCG (1000 UNIT) 1000 UNITS: 25 TAB at 09:08

## 2024-04-09 RX ADMIN — DILTIAZEM HYDROCHLORIDE 30 MG: 30 TABLET, FILM COATED ORAL at 05:22

## 2024-04-09 RX ADMIN — SODIUM CHLORIDE, PRESERVATIVE FREE 10 ML: 5 INJECTION INTRAVENOUS at 09:08

## 2024-04-09 RX ADMIN — METOPROLOL TARTRATE 12.5 MG: 25 TABLET, FILM COATED ORAL at 09:09

## 2024-04-09 RX ADMIN — AMIODARONE HYDROCHLORIDE 200 MG: 200 TABLET ORAL at 09:08

## 2024-04-09 RX ADMIN — LEVOTHYROXINE SODIUM 150 MCG: 25 TABLET ORAL at 05:22

## 2024-04-09 ASSESSMENT — PAIN SCALES - GENERAL: PAINLEVEL_OUTOF10: 0

## 2024-04-09 NOTE — DISCHARGE SUMMARY
Hospitalist Discharge Summary    Clover Barry  :  1950  MRN:  [id number]    Admit date:  2024  Discharge date:  24    Admitting Physician:  Rd Mccarty MD    Discharge Diagnoses:   Atrial fib with RVR - cardioverted to sinus rhythm.      Dypnea - resolved.      Acute on chronic systolic CHF - secondary to atrial fib with RVR.      Hypothyroidism.      HTN      Anxiety disorder.      Hyperlipidemia.      Abnormal stress test.      Vitamin D deficiency.    Admission Condition:  poor      Discharged Condition:  good    Hospital Course:     The patient is a 74 y.o. with history of atrial fibrillation, hypertension, hyperlipidemia, abnormal Lexiscan stress test, anxiety disorder, hypothyroidism presented to the emergency room complaining of worsening shortness of breath started about 1 week ago and progressively worsening.  She states any minimal activity would make her feel out of breath.  She noticed that laying down was making her shortness of breath worse which was moderate to severe in intensity.  She had associated nonproductive cough.  She reports no chest pain, no palpitations, no dizziness.  States sometimes did feel sweaty and nauseous, no vomiting.  She has a history of atrial fibrillation and follows up with Dr. Linn.  She was hospitalized at our facility from 2024 - 2024 for A-fib with RVR.  Was on Cardizem drip in ICU and was started on Eliquis.  Nuclear stress test showed an anterior defect with the concerns of anterior ischemia.  She was discharged home with Cardizem, Lopressor, digoxin and Eliquis as well as event monitor.  She followed up with Dr. Linn.  Underwent cardioversion 3/19/24 and converted to sinus rhythm but reverted back to A-fib.  Was placed on amiodarone 200 mg daily.  Patient states that she has been compliant with her medications.  Workup in the emergency room revealed heart rate of 165, blood pressure 139/115, oxygen saturation 94% on room air.  She

## 2024-04-09 NOTE — DISCHARGE INSTRUCTIONS
Discharge Instructions    Admission Date:  4/5/2024  Discharge Date:  4/9/24    Disposition:   Home.    Discharge Instructions:  Resume previous home medication but change Levothyroxine to 125 mcg daily.  Activity:  No strenuous activity until cleared by Dr. Linn.  Diet:  Cardiac.    Follow up with Hien Whitaker APRN - CNP in 1 week.

## 2024-04-09 NOTE — PROGRESS NOTES
Met with Patient during quality flow rounding this a.m. to discuss discharge planning.  Patient is a 74 year old , white female, admitted with a diagnosis of Atrial Fibrillation with RVR.  Patient is alert and oriented, pleasant and cooperative with this assessment.  States that her plan is to return home when deemed medically stable for discharge.    Patient lives alone in [place].  She currently uses no DME and has no outside resources or services in place.  Patient reports that she is active and independent with all ADL's.  She drives herself and provides for her own transportation needs at this point.  Children live in the area and good informal support network noted if/as needed.    PCP is Hien Whitaker CNP.  Patient has medical insurance and reports having no difficulty with  regards to affording her prescription medication.    Discharge plan is home when stable.  Patient is a 'Full Code' status and she reports that she does have a Living Will and a Healthcare POA in place.  Patient plan is to return home when stable.  No further discharge planning needs or concerns identified by Patient at this time.  LSW to continue to monitor and assist with needs/concerns as they present.    YOMI Adler  4/8/2024    
   04/07/24 0806   Vitals   Temp 97.3 °F (36.3 °C)   Temp Source Axillary   Pulse (!) 112   Heart Rate Source Brachial   Cardiac Rhythm A flutter   Respirations 18   /71   BP Location Left upper arm   BP Method Automatic   Patient Position Semi fowlers   SpO2 97 %   Pulse Oximeter Device Mode Intermittent   Pulse Oximeter Device Location Finger   O2 Device None (Room air)   Level of Consciousness 0   Pain Assessment   Pain Assessment None - Denies Pain     Patient reports last night was \"the best sleep she has had all week.\" Updated on new orders placed by Dr. Linn. IV Lasix given, PO dose held.   
1209-discharge summary faxed to Hien Whitaker's [phone] with successful confirmation of faxed received.   
Acknowledge pt discharge to home this date. Pt identifies no discharge needs or concerns. Deja Valera MSW LSW 4/9/2024   
Cardiology    Will do cardioversion sometime...    Humberto Linn MD  
Cardiology    Would place back on Prinzide 10-12.5 daily.    Will move cardiac cath to April 26 in [place].    Will discharge today.    Humberto Linn MD  
Cardiology  Full note dictated    Atrial flutter with RVR  CV 3-19-24, with reversion to atrial flutter/fib  SOB more pronounced in past week, due to rapid ventricular response.  Amiodarone 200 mg daily started 3-19  Abnormal CST  Planned cardiac cath on 5-17  Possible right pleural effusion    Plan:  Control RVR over wkend  Cardioversion on Monday AM  CT of lungs today.    She has had increasing sob over past week secondary to RVR.  Came to ER and HR was 130-150 bpm.    Of note, she has sss and had bradycardia on first initiating Amiodarone.  Therefore, had hesitation to adjust meds, and discharge from ER.    Plan on monitoring rhythm and cardioverting Monday AM.    Her CXR has not been read as of yet, however, appears to have right pleural effusion vs infiltrate.  Will do non-contrast CT.    Will start Lasix 40 mg IV for possible effusion.    Thanks, Humberto Linn MD  
Cardizem 30mg tablet given early due to increased HR. Patient remains in A Fib.     HR running between 120-159bpm    PRN IV cardizem can be given after 0545.  
Discussed Afib, cardiac diet, cardioversion, diurectics, cardiac cath education given.   
Dr. Mccarty updated on patient status.     Patient is alert and oriented to time, place, person and situation.Medications administered as ordered. IV in RAC replaced. IV now placed in left hand.     VITALS:  /64   Pulse 94   Temp 97.7 °F (36.5 °C) (Oral)   Resp 17   Ht 1.702 m (5' 7\")   Wt 86.3 kg (190 lb 4.8 oz)   SpO2 95%   BMI 29.81 kg/m² .     Pt reports pain 0 out of 10.     Telemetry shows AFIB. Patient continues to have episodes of HR in the 140 - 160s for only a second or two, then HR goes back down and remains between 98-130s.     Patient is asymptomatic. PRN IV cardizem given.     Patient rests comfortably in bed, call light within reach.   
Hospitalist Progress Note  4/8/2024 4:05 AM  Subjective:   Admit Date: 4/5/2024  PCP: Hien Whitaker, APRN - CNP    Interval History: Herminia has no complaints this morning.  She states last night was the best sleep she has had in 1 week.  Continues to be more SOB with ambulating than normal.  No chest pain.  Appetite has been good, no nausea. Bowels moving and she denies any trouble urinating.  Plans for cardioversion this morning.    Diet: Diet NPO  Medications:   Scheduled Meds:   furosemide  40 mg IntraVENous BID    levothyroxine  150 mcg Oral Daily    dilTIAZem  30 mg Oral 4 times per day    aspirin  81 mg Oral Daily    atorvastatin  20 mg Oral Daily    busPIRone  15 mg Oral BID    venlafaxine  75 mg Oral Daily with breakfast    Vitamin D  1,000 Units Oral Daily    sodium chloride flush  5-40 mL IntraVENous 2 times per day    amiodarone  200 mg Oral Daily    apixaban  5 mg Oral BID    [Held by provider] lisinopril  20 mg Oral Daily    metoprolol tartrate  12.5 mg Oral BID     Continuous Infusions:   sodium chloride         Patient's current medications documented, reviewed, and updated.      CBC:   Recent Labs     04/05/24  2300   WBC 8.1   HGB 15.0        BMP:    Recent Labs     04/05/24  2300 04/07/24  0513    139   K 4.0 3.9    103   CO2 23 26   BUN 28* 26*   CREATININE 1.2* 1.1*   GLUCOSE 123* 116*     Hepatic:   Recent Labs     04/07/24  0513   AST 19   ALT 30   BILITOT 0.8   ALKPHOS 106*     Troponin: No results for input(s): \"TROPONINI\" in the last 72 hours.  BNP: No results for input(s): \"BNP\" in the last 72 hours.  Lipids: No results for input(s): \"CHOL\", \"HDL\" in the last 72 hours.    Invalid input(s): \"LDLCALCU\"  INR: No results for input(s): \"INR\" in the last 72 hours.      Objective:   Vitals: /85   Pulse (!) 121   Temp 97.5 °F (36.4 °C) (Oral)   Resp 20   Ht 1.702 m (5' 7\")   Wt 86.3 kg (190 lb 4.8 oz)   SpO2 93%   BMI 29.81 kg/m²   General appearance: alert and 
Hospitalist Progress Note  4/9/2024 4:28 AM  Subjective:   Admit Date: 4/5/2024  PCP: Hien Whitaker, APRN - CNP    Interval History: Herminia states she is feeling much better.  Her breathing is almost back to normal.  She has maintained sinus rhythm after cardioversion yesterday.  No chest pain.  Appetite is good, no nausea.  Bowels moving and she is urinating frequently with Lasix.      Diet: ADULT DIET; Regular  Medications:   Scheduled Meds:   furosemide  40 mg IntraVENous BID    levothyroxine  150 mcg Oral Daily    dilTIAZem  30 mg Oral 4 times per day    aspirin  81 mg Oral Daily    atorvastatin  20 mg Oral Daily    busPIRone  15 mg Oral BID    venlafaxine  75 mg Oral Daily with breakfast    Vitamin D  1,000 Units Oral Daily    sodium chloride flush  5-40 mL IntraVENous 2 times per day    amiodarone  200 mg Oral Daily    apixaban  5 mg Oral BID    [Held by provider] lisinopril  20 mg Oral Daily    metoprolol tartrate  12.5 mg Oral BID     Continuous Infusions:   sodium chloride         Patient's current medications documented, reviewed, and updated.      CBC: No results for input(s): \"WBC\", \"HGB\", \"PLT\" in the last 72 hours.  BMP:    Recent Labs     04/07/24  0513 04/08/24  0332 04/09/24  0324    139 138   K 3.9 3.9 3.9    102 101   CO2 26 25 27   BUN 26* 25* 29*   CREATININE 1.1* 1.1* 1.2*   GLUCOSE 116* 120* 112*     Hepatic:   Recent Labs     04/07/24  0513   AST 19   ALT 30   BILITOT 0.8   ALKPHOS 106*     Troponin: No results for input(s): \"TROPONINI\" in the last 72 hours.  BNP: No results for input(s): \"BNP\" in the last 72 hours.  Lipids: No results for input(s): \"CHOL\", \"HDL\" in the last 72 hours.    Invalid input(s): \"LDLCALCU\"  INR: No results for input(s): \"INR\" in the last 72 hours.      Objective:   Vitals: /64   Pulse 65   Temp 98 °F (36.7 °C) (Oral)   Resp 16   Ht 1.702 m (5' 7\")   Wt 86.3 kg (190 lb 4.8 oz)   SpO2 91%   BMI 29.81 kg/m²   General appearance: alert and 
IMM letter provided to patient.  Patient offered four hours to make informed decision regarding appeal process; patient agreeable to discharge.     
Nutrition Assessment     Type and Reason for Visit: Initial    Nutrition Recommendations/Plan:   Encourage PO post cardioversion     Malnutrition Assessment:  Malnutrition Status: No malnutrition    Nutrition Assessment:  No nutrition diagnosis at this time. Pending cardioversion noted with afib. Taking PO well prior to NPO status.    Nutrition Related Findings:   EVANGELINA Wound Type: None    Current Nutrition Therapies:    Diet NPO    Anthropometric Measures:  Height: 170.2 cm (5' 7\")  Current Body Wt: 86.3 kg (190 lb 4.8 oz)   BMI: 29.8    Nutrition Diagnosis:   No nutrition diagnosis at this time       Lab Results   Component Value Date     04/08/2024    K 3.9 04/08/2024     04/08/2024    CO2 25 04/08/2024    BUN 25 (H) 04/08/2024    CREATININE 1.1 (H) 04/08/2024    GLUCOSE 120 (H) 04/08/2024    CALCIUM 9.3 04/08/2024    PROT 6.4 04/07/2024    LABALBU 3.8 04/07/2024    BILITOT 0.8 04/07/2024    ALKPHOS 106 (H) 04/07/2024    AST 19 04/07/2024    ALT 30 04/07/2024    LABGLOM 53 (L) 04/08/2024     No results found for: \"LABA1C\"  No results found for: \"VITD25\"    Nutrition Interventions:   Food and/or Nutrient Delivery: Start Oral Diet (resume after cardioversion)  Nutrition Education/Counseling: No recommendation at this time  Coordination of Nutrition Care: Continue to monitor while inpatient  Plan of Care discussed with: no one    Goals:     Goals: Meet at least 75% of estimated needs       Nutrition Monitoring and Evaluation:   Behavioral-Environmental Outcomes: None Identified  Food/Nutrient Intake Outcomes: None Identified  Physical Signs/Symptoms Outcomes: Weight, Biochemical Data    Discharge Planning:    No discharge needs at this time     Everette Corey RD, LD  Contact: [phone]    
Patient arrives to unit via wheelchair. Patient ambulates independently to bed. Report received from  Kaela DYE at this time. Oriented to room, call light system, bed rails, phone, lights, dry erase board and bathroom. Vital signs obtained. Nursing assessment complete. Telemetry monitor applied, patient aware of placement and reason.    Orders reviewed with patient. Patient instructed about the schedule of the day including: vital sign frequency, lab draws, possible tests, frequency of MD and staff rounds, daily weights, I &O's and prescribed diet. Patient is alert and oriented to time, place, person and situation. Admission questions answered with Patient as source. Patient is is not a high fall risk, discussed such with patient, as well as, fall prevention strategies. Ice water provided. All care needs addressed with no further needs at this time. Patient is currently resting in bed with brakes locked, in lowest position, side rails up 2/4, call light and bedside table within reach.     VITALS:  BP (!) 130/101   Pulse (!) 138   Temp 97.6 °F (36.4 °C) (Oral)   Resp 20   Ht 1.702 m (5' 7\")   Wt 86.3 kg (190 lb 4.8 oz)   SpO2 94%   BMI 29.81 kg/m²               
Patient converted to NSR after 1 shock via cardioversion. Recovered by this RN. Once patient alert denies any pain. Eats lunch with no complication. Daughter Jaimie at bedside.   
Patient remains in A fib with a rate betweem 110-158bpm.     PRN IV cardizem given.     Vitals:    04/06/24 0605   BP: (!) 121/93   Pulse: (!) 132   Resp:    Temp:    SpO2:        
Patient urine output since giving IV lasix 20mg @ 0258 = 1500ml  
Ticket to ride completed. The following information was reported off to Annika in Imaging:  Name  Allergies  Orientation Level  Destination  Safety Issues  Code Status  Oxygen Requirements  Special needs including mobility, language, communication    
VS obtained, nursing assessment completed. Patient reports not sleeping well, and SOB with exertion. Denies CP, dizziness, numbness, tingling, SOB at rest.   
distress  Cardiovascular: Tachycardic, irregularly irregular normal S1 and S2, no murmurs, rubs, clicks, or gallops, distal pulses intact,   Pulmonary/Chest: Clear to auscultation, no crackles, no rhonchi, no wheezes,  normal air movement, no respiratory distress  Abdomen: soft, non-tender, non-distended, normal bowel sounds  Extremities: no cyanosis, clubbing or edema  Skin: warm and dry, no rash   Neurological: alert, oriented, normal speech, no focal findings or movement disorder noted      Assessment and Plan:     1.  A-fib with RVR  - continues with episodes of tachycardia. Continue with Cardizem 30 mg every 6 hours , Also on Lopressor 12.5 mg twice daily.  Continue on amiodarone.  Blood pressure is borderline low, hold her lisinopril and HCTZ.   Appreciate Dr. Linn's help. She is anticoagulated with Eliquis.  Plan is to proceed with cardioversion tomorrow  2.  Shortness of breath, possibly due to acute on chronic systolic CHF -started on IV Lasix   Dr. Linn ordered CT chest without contrast which is pending.  Echo on 2/27/2024 showed EF of 45 to 50%  3.  Hypothyroidism -   TSH increased most likely amiodarone induced.  Will increase Synthroid to 150 mcg, follow-up with TSH outpatient  4.  Hyperlipidemia -on Lipitor  5.  Hypertension -holding lisinopril and HCTZ due to relatively low BP  6.  Abnormal Lexiscan stress test - Dr. Linn planning on doing heart catheterization on 5/17/2024.  7.  Anxiety disorder -mood is stable on BuSpar     Differential Diagnosis: CHF, unstable angina, arrhythmia, less likely bronchitis or pneumonia,     Condition is improving / unchanged / worsening: Improving     Condition is a treatment goal: No     Chronic condition is / is not having mild / moderate, severe exacerbation, progression or side effects of treatment:          Shared decision making:          Code status and discussions: Full code        Medical Necessity:  Inpatient is appropriate for this patient due to

## 2024-04-10 LAB
EKG ATRIAL RATE: 64 BPM
EKG P AXIS: 64 DEGREES
EKG P-R INTERVAL: 172 MS
EKG Q-T INTERVAL: 498 MS
EKG QRS DURATION: 86 MS
EKG QTC CALCULATION (BAZETT): 513 MS
EKG R AXIS: 167 DEGREES
EKG T AXIS: 165 DEGREES
EKG VENTRICULAR RATE: 64 BPM

## 2024-04-15 ENCOUNTER — TELEPHONE (OUTPATIENT)
Dept: CARDIOLOGY CLINIC | Age: 74
End: 2024-04-15

## 2024-04-15 NOTE — TELEPHONE ENCOUNTER
Patient was hospitalized last week for afib and had a cardioversion.  She went back into [facility] yesterday. She is scheduled to have a Heart Cath 4/26/24.  What should she do.  Please advise

## 2024-07-15 ENCOUNTER — APPOINTMENT (OUTPATIENT)
Dept: GENERAL RADIOLOGY | Age: 74
End: 2024-07-15
Payer: MEDICARE

## 2024-07-15 ENCOUNTER — HOSPITAL ENCOUNTER (EMERGENCY)
Age: 74
Discharge: HOME | End: 2024-07-15
Attending: FAMILY MEDICINE
Payer: MEDICARE

## 2024-07-15 VITALS
SYSTOLIC BLOOD PRESSURE: 109 MMHG | TEMPERATURE: 98 F | HEIGHT: 67 IN | WEIGHT: 181.3 LBS | OXYGEN SATURATION: 96 % | BODY MASS INDEX: 28.46 KG/M2 | HEART RATE: 66 BPM | DIASTOLIC BLOOD PRESSURE: 81 MMHG | RESPIRATION RATE: 18 BRPM

## 2024-07-15 DIAGNOSIS — Z78.9 DIPHTHERIA-PERTUSSIS-TETANUS (DPT) VACCINATION ADMINISTERED AT CURRENT VISIT: ICD-10-CM

## 2024-07-15 DIAGNOSIS — S91.114A LACERATION OF LESSER TOE OF RIGHT FOOT WITHOUT FOREIGN BODY PRESENT OR DAMAGE TO NAIL, INITIAL ENCOUNTER: Primary | ICD-10-CM

## 2024-07-15 PROCEDURE — 6370000000 HC RX 637 (ALT 250 FOR IP): Performed by: FAMILY MEDICINE

## 2024-07-15 PROCEDURE — 12001 RPR S/N/AX/GEN/TRNK 2.5CM/<: CPT

## 2024-07-15 PROCEDURE — 73660 X-RAY EXAM OF TOE(S): CPT

## 2024-07-15 PROCEDURE — 90715 TDAP VACCINE 7 YRS/> IM: CPT | Performed by: FAMILY MEDICINE

## 2024-07-15 PROCEDURE — 6360000002 HC RX W HCPCS: Performed by: FAMILY MEDICINE

## 2024-07-15 PROCEDURE — 90471 IMMUNIZATION ADMIN: CPT | Performed by: FAMILY MEDICINE

## 2024-07-15 PROCEDURE — 99284 EMERGENCY DEPT VISIT MOD MDM: CPT

## 2024-07-15 PROCEDURE — 99283 EMERGENCY DEPT VISIT LOW MDM: CPT

## 2024-07-15 RX ORDER — GINSENG 100 MG
CAPSULE ORAL ONCE
Status: COMPLETED | OUTPATIENT
Start: 2024-07-15 | End: 2024-07-15

## 2024-07-15 RX ORDER — SULFAMETHOXAZOLE AND TRIMETHOPRIM 800; 160 MG/1; MG/1
1 TABLET ORAL 2 TIMES DAILY
Qty: 20 TABLET | Refills: 0 | Status: SHIPPED | OUTPATIENT
Start: 2024-07-15 | End: 2024-07-25

## 2024-07-15 RX ORDER — CEPHALEXIN 500 MG/1
500 CAPSULE ORAL 4 TIMES DAILY
Qty: 40 CAPSULE | Refills: 0 | Status: SHIPPED | OUTPATIENT
Start: 2024-07-15

## 2024-07-15 RX ADMIN — TETANUS TOXOID, REDUCED DIPHTHERIA TOXOID AND ACELLULAR PERTUSSIS VACCINE, ADSORBED 0.5 ML: 5; 2.5; 8; 8; 2.5 SUSPENSION INTRAMUSCULAR at 17:45

## 2024-07-15 RX ADMIN — BACITRACIN 1 EACH: 500 OINTMENT TOPICAL at 19:29

## 2024-07-15 ASSESSMENT — PAIN DESCRIPTION - DESCRIPTORS: DESCRIPTORS: ACHING

## 2024-07-15 ASSESSMENT — PAIN DESCRIPTION - ORIENTATION: ORIENTATION: RIGHT

## 2024-07-15 ASSESSMENT — PAIN DESCRIPTION - ONSET: ONSET: ON-GOING

## 2024-07-15 ASSESSMENT — PAIN SCALES - GENERAL: PAINLEVEL_OUTOF10: 3

## 2024-07-15 ASSESSMENT — PAIN DESCRIPTION - FREQUENCY: FREQUENCY: CONTINUOUS

## 2024-07-15 ASSESSMENT — PAIN DESCRIPTION - LOCATION: LOCATION: TOE (COMMENT WHICH ONE)

## 2024-07-15 ASSESSMENT — PAIN DESCRIPTION - PAIN TYPE: TYPE: ACUTE PAIN

## 2024-07-15 ASSESSMENT — PAIN - FUNCTIONAL ASSESSMENT: PAIN_FUNCTIONAL_ASSESSMENT: 0-10

## 2024-07-15 NOTE — ED PROVIDER NOTES
[facility]  EMERGENCY DEPARTMENT ENCOUNTER      Pt Name: Clover Barry  MRN: [id number]  Birthdate 1950  Date of evaluation: 7/15/2024  Provider: Wayne Bobby MD    CHIEF COMPLAINT       Chief Complaint   Patient presents with    Laceration     Right fourth digit laceration from a yard tool         HISTORY OF PRESENT ILLNESS      Clover Barry is a 74 y.o. female who presents to the emergency department via private vehicle with daughter, patient was at home when a pair of garden lio fell off a table striking her in her right foot indicating her fourth toe.  Patient states she is on blood thinners and did bleed a lot.  Unsure of her last tetanus shot but thinks it may have been within the last 10 years.  Denies other injury.    PCP: BEATRIZ Whitaker        REVIEW OF SYSTEMS       Review of Systems   Skin:  Positive for wound.   All other systems reviewed and are negative.        PAST MEDICAL HISTORY     Past Medical History:   Diagnosis Date    A-fib ([facility]) 02/26/2024    Anxiety     Hyperlipidemia     Hypertension     Hypothyroidism          SURGICAL HISTORY       Past Surgical History:   Procedure Laterality Date    HYSTERECTOMY (CERVIX STATUS UNKNOWN)           CURRENT MEDICATIONS       Discharge Medication List as of 7/15/2024  7:09 PM        CONTINUE these medications which have NOT CHANGED    Details   lisinopril-hydroCHLOROthiazide (PRINZIDE;ZESTORETIC) 10-12.5 MG per tablet Take 1 tablet by mouth daily, Disp-30 tablet, R-3Normal      metoprolol tartrate (LOPRESSOR) 25 MG tablet Take 0.5 tablets by mouth 2 times daily, Disp-60 tablet, R-3NO PRINT      levothyroxine (SYNTHROID) 125 MCG tablet Take 1 tablet by mouth daily, Disp-30 tablet, R-5Normal      apixaban (ELIQUIS) 5 MG TABS tablet Take 1 tablet by mouth 2 times daily, Disp-180 tablet, R-3Normal      aspirin 81 MG chewable tablet Take 1 tablet by mouth daily, Disp-90 tablet, R-3Normal      desvenlafaxine succinate (PRISTIQ) 50 MG TB24

## 2025-07-29 DIAGNOSIS — I10 PRIMARY HYPERTENSION: ICD-10-CM

## 2025-07-29 DIAGNOSIS — E55.9 VITAMIN D DEFICIENCY: ICD-10-CM

## 2025-07-29 DIAGNOSIS — Z95.5 HISTORY OF HEART ARTERY STENT: ICD-10-CM

## 2025-07-29 DIAGNOSIS — E78.5 HYPERLIPIDEMIA, UNSPECIFIED HYPERLIPIDEMIA TYPE: ICD-10-CM

## 2025-07-29 DIAGNOSIS — I48.91 ATRIAL FIBRILLATION WITH RVR (HCC): Primary | ICD-10-CM

## 2025-07-29 DIAGNOSIS — Z98.890 H/O PRIOR ABLATION TREATMENT: ICD-10-CM
